# Patient Record
Sex: FEMALE | Race: WHITE | NOT HISPANIC OR LATINO | Employment: FULL TIME | URBAN - METROPOLITAN AREA
[De-identification: names, ages, dates, MRNs, and addresses within clinical notes are randomized per-mention and may not be internally consistent; named-entity substitution may affect disease eponyms.]

---

## 2024-03-27 ENCOUNTER — TRANSFERRED RECORDS (OUTPATIENT)
Dept: HEALTH INFORMATION MANAGEMENT | Facility: CLINIC | Age: 54
End: 2024-03-27

## 2024-08-15 NOTE — TELEPHONE ENCOUNTER
Action 8/15/2024   Action Taken 1) Requested records from [facility] via fax      REASON FOR VISIT: bilateral knee pain requesting for cortisone injection    DATE OF APPT: 8/17/2024   NOTES (FOR ALL VISITS) STATUS DETAILS   OFFICE NOTE from referring provider N/A    MEDICATION LIST Internal    IMAGING  (FOR ALL VISITS)     XR In process    MRI (HEAD, NECK, SPINE) N/A    CT (HEAD, NECK, SPINE) N/A

## 2024-08-16 NOTE — PROGRESS NOTES
Joyce Beck  :  1970  DOS: 2024  MRN: [id number]  PCP: No Ref-Primary, Physician    Sports Medicine Clinic Visit      HPI  Joyce Beck is a 54 year old female who is seen as a self referral presenting with bilateral knee pain, chronic.    - Mechanism of Injury:    - No inciting injury  - Pertinent history and prior evaluations:    - Previously seen with [facility] for bilateral primary knee osteoarthritis, worst in the right medial compartment.   - Has gotten bilateral Synvisc 1 injections on 2024.    - Also taking diclofenac for pain management.    - Corticosteroid injection of the right knee on 3/27/2024.    - XR R knee with weightbearing bilateral views in PA flexion shows moderate degenerative changes bilaterally, worst in the medial compartments, right greater than left.  No acute fractures or dislocations.  No significant knee joint effusion.  Prominent osteophytes in the superior patellofemoral compartment on the right.  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    - Pain Character:    -Pain in the anteromedial knees bilaterally that is worse with direct pressure, prolonged walking, squatting, getting in and out of chairs, and other activities.  Mild swelling occasionally.  Mild grinding, no major instability episodes, mechanical locking symptoms.  Denies neurogenic components, numbness, tingling, radicular shooting pain.  Antalgic weakness present.  Treatments tried are listed above.      Review of Systems  Musculoskeletal: as above  Remainder of review of systems is negative including constitutional, CV, pulmonary, GI, Skin and Neurologic except as noted in HPI or medical history.    Past Medical History:   Diagnosis Date    Unspecified hypothyroidism      Past Surgical History:   Procedure Laterality Date    C LIGATE FALLOPIAN TUBE       Family History   Problem Relation Age of Onset    Cancer No family hx of     Diabetes Paternal Grandfather     Diabetes Brother     Diabetes Sister      Heart Disease No family hx of     Heart Disease Maternal Grandfather         MI     Hypertension Paternal Grandfather     Cerebrovascular Disease No family hx of          Objective  There were no vitals taken for this visit.    General: healthy, alert and in no acute distress.    HEENT: no scleral icterus or conjunctival erythema.   Skin: no suspicious lesions or rash. No jaundice.   CV: regular rhythm by palpation, 2+ distal pulses.  Resp: normal respiratory effort without conversational dyspnea.   Psych: normal mood and affect.    Gait: nonantalgic, appropriate coordination and balance.     Neuro:        - Sensation to light touch:    - Intact throughout the BLE including all peripheral nerve distributions.        - MSR:      RLE  LLE  - Patella 2+ 2+  - Achilles 2+ 2+       - Special tests:   - Slump/SLR:  Neg    MSK - Knee:       - Inspection:    - No significant effusion, erythema, warmth, ecchymosis, lesion.        - ROM:    - Full AROM/PROM with pain during knee flexion/extension.        - Palpation:    - TTP at the medial joint line.  - NTTP elsewhere.        - Strength:  (*antalgic)  - Hip Flexion  5     - Hip Abduction 5    - Hip Adduction 5   - Knee Flexion  5   - Knee Extension 5   - Dorsiflexion  5   - Plantarflexion 5   - Ext. Jovi. Longus 5   - Inversion  5   - Eversion  5        - Special tests:        - Lachman:  Neg         - A/P drawer:  Neg        - Pivot shift:  Neg    - rCissy:  Neg      - Varus stress:  Neg for laxity or pain     - Valgus stress:  Neg for laxity or pain    - Patellar grind: Positive   - Thessaly:  Neg         Radiology  I independently reviewed the available relevant imaging in the chart with my interpretations as above in HPI.       Procedure  Large Joint Injection/Arthocentesis: bilateral knee    Date/Time: 2024 10:10 AM    Performed by: Neeraj Can DO  Authorized by: Neeraj Can DO    Indications:  Pain  Needle Size:  22 G  Guidance: ultrasound     Approach:  Lateral  Location:  Knee  Laterality:  Bilateral      Medications (Right):  40 mg triamcinolone 40 MG/ML  Medications (Left):  40 mg triamcinolone 40 MG/ML  Outcome:  Tolerated well, no immediate complications  Procedure discussed: discussed risks, benefits, and alternatives    Consent Given by:  Patient  Timeout: timeout called immediately prior to procedure    Prep: patient was prepped and draped in usual sterile fashion      Ultrasound Guided Intra-articular Knee Injection  The knee was prepped and draped in a sterile manner.  Ultrasound identification of the patella, suprapatellar pouch, quadriceps tendon and femur in both long and short axis was obtained. The probe was placed in short axis to the femur. A 1.5 inch 22 gauge needle was placed under ultrasound guidance into the superior knee joint using a lateral approach.  A mixture of 2 mL of 1% lidocaine, 2 mL of 0.5% bupivacaine and 1 ml kenalog (40mg/ml) was injected without difficulty. The needle was removed and there was good hemostasis without complications.  Patient tolerated procedure well.  There was ultrasound documentation of needle placement and injection. The procedure was duplicated on the contralateral side using an identical protocol.         Assessment  1. Primary osteoarthritis of both knees        Plan  Joyce Beck is a pleasant 54 year old female that presents with chronic bilateral knee pain secondary to primary osteoarthritis.  Previously managed with [facility] orth[facility] with corticosteroid injections and Synvisc injections.  Last corticosteroid injection was in March 2024.  Presents today to discuss additional injections and other treatment options.    We discussed the nature of the condition and available treatment options, and mutually agreed upon the following plan:    - Imaging:          - Reviewed and independently interpreted the relevant imaging in the chart, including any imaging ordered for today's  clinic.  - Reviewed results and images with patient.   - Medications:          - Discussed pharmacologic options for pain relief.   - May use NSAIDs (Ibuprofen, Naproxen) or Acetaminophen (Tylenol) as needed for pain control.   - Do not take these if previously advised to avoid them for other medical conditions.  - May also use topical medications such as lidocaine, IcyHot, BioFreeze, or Voltaren gel as needed for pain control.    - Voltaren gel is an anti-inflammatory cream that may be used up to 4 times per day over the painful area.   - Injections:          - Discussed possible injection options and alternatives.   - Performed a corticosteroid injection of the bilateral intra-articular knee joints under ultrasound guidance today in clinic. Patient tolerated the procedure well without complications.     - Post-procedure instructions:    - Keep the injection site clean and dry.   - Do not submerge the injection site for 24 hours (no baths, pools). Showers are ok.   - Rest the area for 24-48 hours before resuming normal activities. Avoid overexerting the area for the first few weeks.   - It may take 2-3 days to start noticing the effects of the injection and up to 3-4 weeks to feel significant benefits.   - Therapy:          - Discussed the benefits of therapy vs home exercise program for optimization of range of motion, flexibility, strength, stability and function.   - Preference is for a home exercise program.   - Home Exercise Program given today in clinic and recommendation given to perform HEP daily and after exacerbations.  - Modalities:          - May use ice, heat, massage or other modalities as needed.   - Bracing:          - Discussed bracing options and may consider an  brace,  bracing trial referral has been placed today and instructions given for scheduling.  - Surgery:          - Discussed non-operative and operative treatment options for the patient's condition.   - Goal is to  continue conservative care for as long as possible before surgical intervention would need to be considered.  - Activity:          - Encouraged to remain active and participate in regular activities as symptoms allow.   Avoid or modify exacerbating activities as needed.  - Follow up:          - As needed for re-evaluation and update to treatment plan.  - May follow up sooner for new/worsening symptoms.  - May contact clinic by phone or MyChart for questions or concerns.       Neeraj Can DO, CAQSM  [facility] Medicine  [facility] - Department of Orthopedic Surgery       Disclaimer:  This note was prepared and written using Dragon Medical dictation software. As a result, there may be errors in the script that have gone undetected. Please consider this when interpreting the information in this note.

## 2024-08-17 ENCOUNTER — OFFICE VISIT (OUTPATIENT)
Dept: ORTHOPEDICS | Facility: CLINIC | Age: 54
End: 2024-08-17
Payer: COMMERCIAL

## 2024-08-17 ENCOUNTER — PRE VISIT (OUTPATIENT)
Dept: ORTHOPEDICS | Facility: CLINIC | Age: 54
End: 2024-08-17

## 2024-08-17 DIAGNOSIS — M17.0 PRIMARY OSTEOARTHRITIS OF BOTH KNEES: Primary | ICD-10-CM

## 2024-08-17 PROCEDURE — 99204 OFFICE O/P NEW MOD 45 MIN: CPT | Mod: 25 | Performed by: STUDENT IN AN ORGANIZED HEALTH CARE EDUCATION/TRAINING PROGRAM

## 2024-08-17 PROCEDURE — 20611 DRAIN/INJ JOINT/BURSA W/US: CPT | Mod: 50 | Performed by: STUDENT IN AN ORGANIZED HEALTH CARE EDUCATION/TRAINING PROGRAM

## 2024-08-17 RX ORDER — DICLOFENAC SODIUM 75 MG/1
75 TABLET, DELAYED RELEASE ORAL 2 TIMES DAILY
COMMUNITY
Start: 2023-12-21 | End: 2024-09-23

## 2024-08-17 RX ORDER — LEVOTHYROXINE SODIUM 88 UG/1
88 TABLET ORAL DAILY
COMMUNITY
End: 2024-09-23

## 2024-08-17 RX ADMIN — TRIAMCINOLONE ACETONIDE 40 MG: 40 INJECTION, SUSPENSION INTRA-ARTICULAR; INTRAMUSCULAR at 10:10

## 2024-08-17 ASSESSMENT — PAIN SCALES - GENERAL: PAINLEVEL: EXTREME PAIN (8)

## 2024-08-17 NOTE — PATIENT INSTRUCTIONS
For questions about the cost of your visit, or the cost of any procedure, lab or imaging study, please contact our CONSUMER PRICE LINE at [phone] for an estimate.  You may also contact them at [email or web address]    You will be asked to provide your name, birthdate, address, phone number, and insurance information.  You will also need to know the name of any specific test or procedure which is planned.  This often requires the CPT (common procedural terminology) code for the test or procedure.  Our clinic staff can help you get that information, if needed.      ------------------------------------------------  What is PRP?   Platelet-rich plasm  (PRP) is a procedure in which the platelets/healing factors are concentrated using the patient's own blood.  The procedure is used to stimulate the body's own natural healing response.    What MSK Conditions Can be PRP be Used for?  Tendinosis/tendonitis  Acute and Chronic muscle strains/tears  Osteoarthritis   Ligament Sprains    How does it Work?  The day of the procedure a small amount of blood will be drawn from the patient's arm.  The blood is then placed into a centrifuge to separate the red blood cells and other blood components to produce PRP.  Using ultrasound guidance to ensure accuracy, the concentrated PRP is then injected into the injured area/tissue.  Following the injection, the PRP releases cell-growth factors that trigger the body's natural healing response.  You will experience increased inflammation and soreness for several days following the procedure.  You should treat area as a new injured and may be placed on crutches or a sling for a short-period.  PRP can usually expect relief 6 - 8 weeks following the procedure.  For chronic issues a repeat injection is sometimes needed.  The decision for repeat injections will be made between you and your doctor.    Is PRP Covered by Health Insurance?  PRP is considered an experimental procedure that  is cash based at this time.  [facility] currently charges $800/body part or $1200 for multiple sites in a single day.  Payment for the procedure is due the day of the injection.    It is possible that your HSA dollars may be used for the procedure, however patients are encouraged to check with their insurance first.      What to do Prior to your PRP Procedure?  Patients should not use any anti-inflammatory medications/NSAIDs (like aspirin, naproxen, ibuprofen, diclofenac,  ) for at least 3 weeks prior to the injection.  You may use Tylenol/acetaminophen for pain control as needed.  Patients on long-term anticoagulation medications like Warfarin/Coumadin, Eliquis, Lovenox, or Plavix should discuss holding the medication for 1 - 2 days with anticoagulation clinic.  Please hydrate by drinking 8 glasses (64 oz) of water within 24 hours of the procedure.  Arrange for someone to drive you home after the procedure    What to Expect Day of Procedure?  Forms will be completed and payment of ($800/1200) is due when checking in for the procedure.  Your arm will be cleaned, and a small amount of blood will be drawn.  The blood will then be taken to a centrifuge to spun down to separate the PRP from the whole blood.    After the PRP is  you will be injected with the concentrated PRP under ultrasound guidance.    Please plan for this appointment to take ~ 60 minutes to complete.    After the PRP Procedure?  You may experience increased achiness and soreness for 3 - 5 days following the procedure.  Your provider may discuss providing a small amount of narcotic pain medication to help with pain.  Please schedule to have another person drive you home following your procedure.  Patients should not use any anti-inflammatory medications/NSAIDs (like aspirin, naproxen, ibuprofen, diclofenac,  ) for at least 3 weeks following the procedure.  Avoid using ice on the area for ~ 2 weeks.  You may use Tylenol/acetaminophen for  pain control as needed.    Plan to rest the remainder of the day and for approximately 3 days following the procedure.    You may be in a sling or on crutches for ~ 3 - 5 days.  Plan to provide the clinic with an update either via phone or [employer] approximately 2 weeks after your procedure.  We will schedule a follow up appointment for approximately 6 weeks after the procedure to formally reassess your pain levels.    Specific physical therapy instructions will be provided if needed.  Most patients require only a single treatment, depending on the degree of injury.  However each individual case is different and repeat treatments may be indicated. The decision for repeat injections will be made between you and your doctor.      Please contact the clinic or reach out via [employer] to schedule your PRP procedure or with further questions for your clinical teams.      For information about how your insurance will cover your clinic visit, please call the customer service number on your insurance card.

## 2024-08-17 NOTE — NURSING NOTE
Chief Complaint   Patient presents with    Right Knee - Pain, New Patient    Left Knee - Pain, New Patient       There were no vitals filed for this visit.    There is no height or weight on file to calculate BMI.      SARINA Hadley NREMT

## 2024-08-17 NOTE — LETTER
2024      Joyce Beck  [address] [address] [facility] [address]      Dear Colleague,    Thank you for referring your patient, Joyce Beck, to the [facility] [place]. Please see a copy of my visit note below.    Joyce Beck  :  1970  DOS: 2024  MRN: [id number]  PCP: No Ref-Primary, Physician    Sports Medicine Clinic Visit      HPI  Joyce Beck is a 54 year old female who is seen as a self referral presenting with bilateral knee pain, chronic.    - Mechanism of Injury:    - No inciting injury  - Pertinent history and prior evaluations:    - Previously seen with [facility] for bilateral primary knee osteoarthritis, worst in the right medial compartment.   - Has gotten bilateral Synvisc 1 injections on 2024.    - Also taking diclofenac for pain management.    - Corticosteroid injection of the right knee on 3/27/2024.    - XR R knee with weightbearing bilateral views in PA flexion shows moderate degenerative changes bilaterally, worst in the medial compartments, right greater than left.  No acute fractures or dislocations.  No significant knee joint effusion.  Prominent osteophytes in the superior patellofemoral compartment on the right.  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    - Pain Character:    -Pain in the anteromedial knees bilaterally that is worse with direct pressure, prolonged walking, squatting, getting in and out of chairs, and other activities.  Mild swelling occasionally.  Mild grinding, no major instability episodes, mechanical locking symptoms.  Denies neurogenic components, numbness, tingling, radicular shooting pain.  Antalgic weakness present.  Treatments tried are listed above.      Review of Systems  Musculoskeletal: as above  Remainder of review of systems is negative including constitutional, CV, pulmonary, GI, Skin and Neurologic except as noted in HPI or medical history.    Past Medical History:   Diagnosis Date     Unspecified  hypothyroidism      Past Surgical History:   Procedure Laterality Date     C LIGATE FALLOPIAN TUBE       Family History   Problem Relation Age of Onset     Cancer No family hx of      Diabetes Paternal Grandfather      Diabetes Brother      Diabetes Sister      Heart Disease No family hx of      Heart Disease Maternal Grandfather         MI      Hypertension Paternal Grandfather      Cerebrovascular Disease No family hx of          Objective  There were no vitals taken for this visit.    General: healthy, alert and in no acute distress.    HEENT: no scleral icterus or conjunctival erythema.   Skin: no suspicious lesions or rash. No jaundice.   CV: regular rhythm by palpation, 2+ distal pulses.  Resp: normal respiratory effort without conversational dyspnea.   Psych: normal mood and affect.    Gait: nonantalgic, appropriate coordination and balance.     Neuro:        - Sensation to light touch:    - Intact throughout the BLE including all peripheral nerve distributions.        - MSR:      RLE  LLE  - Patella 2+ 2+  - Achilles 2+ 2+       - Special tests:   - Slump/SLR:  Neg    MSK - Knee:       - Inspection:    - No significant effusion, erythema, warmth, ecchymosis, lesion.        - ROM:    - Full AROM/PROM with pain during knee flexion/extension.        - Palpation:    - TTP at the medial joint line.  - NTTP elsewhere.        - Strength:  (*antalgic)  - Hip Flexion  5     - Hip Abduction 5    - Hip Adduction 5   - Knee Flexion  5   - Knee Extension 5   - Dorsiflexion  5   - Plantarflexion 5   - Ext. Jovi. Longus 5   - Inversion  5   - Eversion  5        - Special tests:        - Lachman:  Neg         - A/P drawer:  Neg        - Pivot shift:  Neg    - Crissy:  Neg      - Varus stress:  Neg for laxity or pain     - Valgus stress:  Neg for laxity or pain    - Patellar grind: Positive   - Thessaly:  Neg         Radiology  I independently reviewed the available relevant imaging in the chart with my  interpretations as above in HPI.       Procedure  Large Joint Injection/Arthocentesis: bilateral knee    Date/Time: 8/17/2024 10:10 AM    Performed by: Neeraj Can DO  Authorized by: Neeraj Can DO    Indications:  Pain  Needle Size:  22 G  Guidance: ultrasound    Approach:  Lateral  Location:  Knee  Laterality:  Bilateral      Medications (Right):  40 mg triamcinolone 40 MG/ML  Medications (Left):  40 mg triamcinolone 40 MG/ML  Outcome:  Tolerated well, no immediate complications  Procedure discussed: discussed risks, benefits, and alternatives    Consent Given by:  Patient  Timeout: timeout called immediately prior to procedure    Prep: patient was prepped and draped in usual sterile fashion      Ultrasound Guided Intra-articular Knee Injection  The knee was prepped and draped in a sterile manner.  Ultrasound identification of the patella, suprapatellar pouch, quadriceps tendon and femur in both long and short axis was obtained. The probe was placed in short axis to the femur. A 1.5 inch 22 gauge needle was placed under ultrasound guidance into the superior knee joint using a lateral approach.  A mixture of 2 mL of 1% lidocaine, 2 mL of 0.5% bupivacaine and 1 ml kenalog (40mg/ml) was injected without difficulty. The needle was removed and there was good hemostasis without complications.  Patient tolerated procedure well.  There was ultrasound documentation of needle placement and injection. The procedure was duplicated on the contralateral side using an identical protocol.         Assessment  1. Primary osteoarthritis of both knees        Sabino Beck is a pleasant 54 year old female that presents with chronic bilateral knee pain secondary to primary osteoarthritis.  Previously managed with [facility] with corticosteroid injections and Synvisc injections.  Last corticosteroid injection was in March 2024.  Presents today to discuss additional injections and other treatment  options.    We discussed the nature of the condition and available treatment options, and mutually agreed upon the following plan:    - Imaging:          - Reviewed and independently interpreted the relevant imaging in the chart, including any imaging ordered for today's clinic.  - Reviewed results and images with patient.   - Medications:          - Discussed pharmacologic options for pain relief.   - May use NSAIDs (Ibuprofen, Naproxen) or Acetaminophen (Tylenol) as needed for pain control.   - Do not take these if previously advised to avoid them for other medical conditions.  - May also use topical medications such as lidocaine, IcyHot, BioFreeze, or Voltaren gel as needed for pain control.    - Voltaren gel is an anti-inflammatory cream that may be used up to 4 times per day over the painful area.   - Injections:          - Discussed possible injection options and alternatives.   - Performed a corticosteroid injection of the bilateral intra-articular knee joints under ultrasound guidance today in clinic. Patient tolerated the procedure well without complications.     - Post-procedure instructions:    - Keep the injection site clean and dry.   - Do not submerge the injection site for 24 hours (no baths, pools). Showers are ok.   - Rest the area for 24-48 hours before resuming normal activities. Avoid overexerting the area for the first few weeks.   - It may take 2-3 days to start noticing the effects of the injection and up to 3-4 weeks to feel significant benefits.   - Therapy:          - Discussed the benefits of therapy vs home exercise program for optimization of range of motion, flexibility, strength, stability and function.   - Preference is for a home exercise program.   - Home Exercise Program given today in clinic and recommendation given to perform HEP daily and after exacerbations.  - Modalities:          - May use ice, heat, massage or other modalities as needed.   - Bracing:          - Discussed  bracing options and may consider an  brace,  bracing trial referral has been placed today and instructions given for scheduling.  - Surgery:          - Discussed non-operative and operative treatment options for the patient's condition.   - Goal is to continue conservative care for as long as possible before surgical intervention would need to be considered.  - Activity:          - Encouraged to remain active and participate in regular activities as symptoms allow.   Avoid or modify exacerbating activities as needed.  - Follow up:          - As needed for re-evaluation and update to treatment plan.  - May follow up sooner for new/worsening symptoms.  - May contact clinic by phone or MyChart for questions or concerns.       Neeraj Can DO, CAQSM  [facility] Sports Medicine  [facility] - Department of Orthopedic Surgery       Disclaimer:  This note was prepared and written using Dragon Medical dictation software. As a result, there may be errors in the script that have gone undetected. Please consider this when interpreting the information in this note.       Again, thank you for allowing me to participate in the care of your patient.        Sincerely,        Neeraj Can DO

## 2024-08-17 NOTE — NURSING NOTE
[facility]   ORTHOPEDICS & SPORTS MEDICINE  [address] [place] [address]  Dept: [phone]  ______________________________________________________________________________    Patient: Joyce Beck   : 1970   MRN: [id number]   2024    INVASIVE PROCEDURE SAFETY CHECKLIST    Date: 2024   Procedure: Bilateral knee injection  Patient Name: Joyce Beck  MRN: [id number]  YOB: 1970    Action: Complete sections as appropriate. Any discrepancy results in a HARD COPY until resolved.     PRE PROCEDURE:  Patient ID verified with 2 identifiers (name and  or MRN): Yes  Procedure and site verified with patient/designee (when able): Yes  Accurate consent documentation in medical record: Yes  H&P (or appropriate assessment) documented in medical record: Yes  H&P must be up to 20 days prior to procedure and updates within 24 hours of procedure as applicable: NA  Relevant diagnostic and radiology test results appropriately labeled and displayed as applicable: Yes  Procedure site(s) marked with provider initials: NA    TIMEOUT:  Time-Out performed immediately prior to starting procedure, including verbal and active participation of all team members addressing the following:Yes  * Correct patient identify  * Confirmed that the correct side and site are marked  * An accurate procedure consent form  * Agreement on the procedure to be done  * Correct patient position  * Relevant images and results are properly labeled and appropriately displayed  * The need to administer antibiotics or fluids for irrigation purposes during the procedure as applicable   * Safety precautions based on patient history or medication use    DURING PROCEDURE: Verification of correct person, site, and procedures any time the responsibility for care of the patient is transferred to another member of the care team.       Prior to injection, verified patient identity using patient's name and  date of birth.  Due to injection administration, patient instructed to remain in clinic for 15 minutes  afterwards, and to report any adverse reaction to me immediately.    Joint injection was performed.      Drug Amount Wasted:  None.  Vial/Syringe: Single dose vial  Expiration Date:  4/17/2024      Jomar Laird, EMT  August 17, 2024

## 2024-08-23 RX ORDER — TRIAMCINOLONE ACETONIDE 40 MG/ML
40 INJECTION, SUSPENSION INTRA-ARTICULAR; INTRAMUSCULAR
Status: SHIPPED | OUTPATIENT
Start: 2024-08-17

## 2024-09-23 ENCOUNTER — OFFICE VISIT (OUTPATIENT)
Dept: FAMILY MEDICINE | Facility: OTHER | Age: 54
End: 2024-09-23
Payer: COMMERCIAL

## 2024-09-23 VITALS
DIASTOLIC BLOOD PRESSURE: 90 MMHG | OXYGEN SATURATION: 97 % | WEIGHT: 237 LBS | BODY MASS INDEX: 41.99 KG/M2 | SYSTOLIC BLOOD PRESSURE: 130 MMHG | HEART RATE: 81 BPM | HEIGHT: 63 IN | TEMPERATURE: 97.7 F | RESPIRATION RATE: 14 BRPM

## 2024-09-23 DIAGNOSIS — R03.0 ELEVATED BP WITHOUT DIAGNOSIS OF HYPERTENSION: ICD-10-CM

## 2024-09-23 DIAGNOSIS — E03.8 OTHER SPECIFIED HYPOTHYROIDISM: Primary | ICD-10-CM

## 2024-09-23 DIAGNOSIS — M17.0 PRIMARY OSTEOARTHRITIS OF BOTH KNEES: ICD-10-CM

## 2024-09-23 DIAGNOSIS — Z13.1 SCREENING FOR DIABETES MELLITUS: ICD-10-CM

## 2024-09-23 DIAGNOSIS — Z13.220 SCREENING FOR LIPID DISORDERS: ICD-10-CM

## 2024-09-23 PROCEDURE — 99214 OFFICE O/P EST MOD 30 MIN: CPT | Performed by: PHYSICIAN ASSISTANT

## 2024-09-23 RX ORDER — DICLOFENAC SODIUM 75 MG/1
75 TABLET, DELAYED RELEASE ORAL 2 TIMES DAILY PRN
Qty: 180 TABLET | Refills: 3 | Status: SHIPPED | OUTPATIENT
Start: 2024-09-23

## 2024-09-23 RX ORDER — ACETAMINOPHEN 325 MG/1
325-650 TABLET ORAL EVERY 6 HOURS PRN
COMMUNITY
Start: 2023-11-20 | End: 2024-09-23

## 2024-09-23 RX ORDER — ALBUTEROL SULFATE 90 UG/1
2 AEROSOL, METERED RESPIRATORY (INHALATION) EVERY 6 HOURS PRN
COMMUNITY
Start: 2023-09-06

## 2024-09-23 RX ORDER — DICLOFENAC SODIUM 75 MG/1
75 TABLET, DELAYED RELEASE ORAL 2 TIMES DAILY PRN
Qty: 180 TABLET | Refills: 3 | Status: SHIPPED | OUTPATIENT
Start: 2024-09-23 | End: 2024-09-23

## 2024-09-23 RX ORDER — LEVOTHYROXINE SODIUM 88 UG/1
88 TABLET ORAL DAILY
Qty: 90 TABLET | Refills: 3 | Status: SHIPPED | OUTPATIENT
Start: 2024-09-23

## 2024-09-23 RX ORDER — LEVOTHYROXINE SODIUM 88 UG/1
88 TABLET ORAL DAILY
Qty: 90 TABLET | Refills: 3 | Status: SHIPPED | OUTPATIENT
Start: 2024-09-23 | End: 2024-09-23

## 2024-09-23 ASSESSMENT — PAIN SCALES - GENERAL: PAINLEVEL: MILD PAIN (2)

## 2024-09-23 NOTE — PROGRESS NOTES
Assessment & Plan     ICD-10-CM    1. Other specified hypothyroidism  E03.8 TSH with free T4 reflex     levothyroxine (SYNTHROID/LEVOTHROID) 88 MCG tablet     DISCONTINUED: levothyroxine (SYNTHROID/LEVOTHROID) 88 MCG tablet      2. Primary osteoarthritis of both knees  M17.0 Basic metabolic panel  (Ca, Cl, CO2, Creat, Gluc, K, Na, BUN)     diclofenac (VOLTAREN) 75 MG EC tablet     DISCONTINUED: diclofenac (VOLTAREN) 75 MG EC tablet      3. Screening for lipid disorders  Z13.220 Lipid panel reflex to direct LDL Fasting      4. Screening for diabetes mellitus  Z13.1 Basic metabolic panel  (Ca, Cl, CO2, Creat, Gluc, K, Na, BUN)     CANCELED: Glucose        - Patient here to establish care     Thyroid   - Ran out of medication awhile ago, not feeling well   - Recommend restart Levothyroxine at previous dose of 88 mcg   - Will plan to recheck labs in about 6 weeks to assure back in range   - Did answer patient's questions about armor thyroid, will save this for if doesn't continue to do well on Levothyroxine as patient has done in the past     2. OA   - Diclofenac very helpful, ran out   - Reviewed medication use and side effects, refilled     3 & 4.   - Will plan to come fasting to recheck of thyroid labs to get these screening labs     5. Elevated BP   - Not a normal thing for patient, elevated x2         Did have dizziness and some nausea, BP was elevated at home but this is also the first time this has happened to her      Denies shortness of breath, cp, patel   - Recommend monitoring at home, keep a log  - Instructed to let us know if continues to be high at home       PLAN   Restart Levothyroxine 88 mcg   Schedule fasting labs (>10 hours nothing to eat or drink, water is ok)   Recheck ~6 weeks   Monitor BP periodically, keep a log, bring to next appointment   Call if BP consistently >140/90   Do BP when most relaxed       Review of the result(s) of each unique test - See list          Care Everywhere - 8/17/23 - TSH   "               11/20/23 - hgb   Diagnosis or treatment significantly limited by social determinants of health - None     25 minutes spent on the date of the encounter doing chart review, history and exam, documentation and further activities as noted above    The patient indicates understanding of these issues and agrees with the plan.    Follow up: 6 weeks with fasting labs     Isai Olson PA-C  [facility] - Lazaro Cook is a 54 year old, presenting for the following health issues:  Establish Care and Recheck Medication        9/23/2024     4:17 PM   Additional Questions   Roomed by Landy SAVAGE     History of Present Illness       Reason for visit:  Thyroid   She is taking medications regularly.     Hypothyroidism Follow-up    Since last visit, patient describes the following symptoms: Weight stable, no hair loss, no skin changes, no constipation, no loose stools    - Thinks labs were last checked and medication changed last summer     Ran out of medications in July      Taking old dose of 77 mcg here and there      Was increased to 88 mcg     - Elevated BP - new      Checks occasionally   - Nausea and dizziness the last 2 mornings      Checked /90, recheck 120/80 around 30 min later      No chest pain or shortness of breath   - Diclofenac - takes for her knees   - Hasn't had in a few days         Review of Systems  Constitutional, HEENT, cardiovascular, pulmonary, gi and gu systems are negative, except as otherwise noted.      Objective    BP (!) 130/90   Pulse 81   Temp 97.7  F (36.5  C)   Resp 14   Ht 1.604 m (5' 3.15\")   Wt 107.5 kg (237 lb)   SpO2 97%   BMI 41.78 kg/m    Body mass index is 41.78 kg/m .  Physical Exam   GENERAL APPEARANCE: healthy, alert and no distress  EYES: Eyes grossly normal to inspection, PERRLA, conjunctivae and sclerae without injection or discharge, EOM intact   RESP: Lungs clear to auscultation - no rales, rhonchi or wheezes  "   CV: Regular rates and rhythm, normal S1 S2, no S3 or S4, no murmur, click or rub, no peripheral edema and peripheral pulses strong and symmetric bilaterally   MS: No musculoskeletal defects are noted and gait is age appropriate without ataxia   SKIN: No suspicious lesions or rashes, hydration status appears adeuqate with normal skin turgor   PSYCH: Alert and oriented x3; speech- coherent , normal rate and volume; able to articulate logical thoughts, able to abstract reason, no tangential thoughts, no hallucinations or delusions, mentation appears normal, Mood is euthymic. Affect is appropriate for this mood state and bright. Thought content is free of suicidal ideation, hallucinations, and delusions. Dress is adequate and upkept. Eye contact is good during conversation.       Diagnostics: reviewed in Epic, see orders pending in Epic         Signed Electronically by: Veronique Olson PA-C

## 2024-09-23 NOTE — PATIENT INSTRUCTIONS
Restart Levothyroxine 88 mcg   Schedule fasting labs (>10 hours nothing to eat or drink, water is ok)   Recheck ~6 weeks   Monitor BP periodically, keep a log, bring to next appointment   Call if BP consistently >140/90   Do BP when most relaxed

## 2024-11-04 ENCOUNTER — OFFICE VISIT (OUTPATIENT)
Dept: FAMILY MEDICINE | Facility: OTHER | Age: 54
End: 2024-11-04
Payer: COMMERCIAL

## 2024-11-04 VITALS
TEMPERATURE: 97.8 F | OXYGEN SATURATION: 97 % | HEIGHT: 63 IN | HEART RATE: 88 BPM | WEIGHT: 235 LBS | RESPIRATION RATE: 20 BRPM | BODY MASS INDEX: 41.64 KG/M2 | SYSTOLIC BLOOD PRESSURE: 124 MMHG | DIASTOLIC BLOOD PRESSURE: 84 MMHG

## 2024-11-04 DIAGNOSIS — Z12.31 VISIT FOR SCREENING MAMMOGRAM: ICD-10-CM

## 2024-11-04 DIAGNOSIS — Z12.11 SCREEN FOR COLON CANCER: ICD-10-CM

## 2024-11-04 DIAGNOSIS — Z13.220 SCREENING FOR LIPID DISORDERS: ICD-10-CM

## 2024-11-04 DIAGNOSIS — M17.0 PRIMARY OSTEOARTHRITIS OF BOTH KNEES: ICD-10-CM

## 2024-11-04 DIAGNOSIS — E03.9 HYPOTHYROIDISM, UNSPECIFIED TYPE: Primary | ICD-10-CM

## 2024-11-04 DIAGNOSIS — Z13.1 SCREENING FOR DIABETES MELLITUS: ICD-10-CM

## 2024-11-04 LAB
ANION GAP SERPL CALCULATED.3IONS-SCNC: 12 MMOL/L (ref 7–15)
BUN SERPL-MCNC: 16.8 MG/DL (ref 6–20)
CALCIUM SERPL-MCNC: 10.2 MG/DL (ref 8.8–10.4)
CHLORIDE SERPL-SCNC: 100 MMOL/L (ref 98–107)
CHOLEST SERPL-MCNC: 215 MG/DL
CREAT SERPL-MCNC: 0.85 MG/DL (ref 0.51–0.95)
EGFRCR SERPLBLD CKD-EPI 2021: 81 ML/MIN/1.73M2
FASTING STATUS PATIENT QL REPORTED: YES
FASTING STATUS PATIENT QL REPORTED: YES
GLUCOSE SERPL-MCNC: 83 MG/DL (ref 70–99)
HCO3 SERPL-SCNC: 25 MMOL/L (ref 22–29)
HDLC SERPL-MCNC: 50 MG/DL
LDLC SERPL CALC-MCNC: 141 MG/DL
NONHDLC SERPL-MCNC: 165 MG/DL
POTASSIUM SERPL-SCNC: 3.8 MMOL/L (ref 3.4–5.3)
SODIUM SERPL-SCNC: 137 MMOL/L (ref 135–145)
TRIGL SERPL-MCNC: 120 MG/DL
TSH SERPL DL<=0.005 MIU/L-ACNC: 3.4 UIU/ML (ref 0.3–4.2)

## 2024-11-04 PROCEDURE — 80061 LIPID PANEL: CPT | Performed by: PHYSICIAN ASSISTANT

## 2024-11-04 PROCEDURE — 99214 OFFICE O/P EST MOD 30 MIN: CPT | Performed by: PHYSICIAN ASSISTANT

## 2024-11-04 PROCEDURE — 84443 ASSAY THYROID STIM HORMONE: CPT | Performed by: PHYSICIAN ASSISTANT

## 2024-11-04 PROCEDURE — 80048 BASIC METABOLIC PNL TOTAL CA: CPT | Performed by: PHYSICIAN ASSISTANT

## 2024-11-04 PROCEDURE — 36415 COLL VENOUS BLD VENIPUNCTURE: CPT | Performed by: PHYSICIAN ASSISTANT

## 2024-11-04 ASSESSMENT — PAIN SCALES - GENERAL: PAINLEVEL_OUTOF10: MILD PAIN (2)

## 2024-11-04 NOTE — PROGRESS NOTES
Assessment & Plan     ICD-10-CM    1. Hypothyroidism, unspecified type  E03.9 TSH with free T4 reflex     TSH with free T4 reflex      2. Visit for screening mammogram  Z12.31 MA Screen Bilateral w/Elvin      3. Screen for colon cancer  Z12.11 Colonoscopy Screening  Referral      4. Screening for lipid disorders  Z13.220 Lipid panel reflex to direct LDL Fasting      5. Screening for diabetes mellitus  Z13.1 Basic metabolic panel  (Ca, Cl, CO2, Creat, Gluc, K, Na, BUN)      6. Primary osteoarthritis of both knees  M17.0 Basic metabolic panel  (Ca, Cl, CO2, Creat, Gluc, K, Na, BUN)        Thyroid   - Had been off medication for awhile, restarted and is feeling better   - Will update labs today   - Results will be communicated to patient when available and appropriate medication changes made if necessary     2-5.   - Advised on health maintenance items that she is due for   - Patient to schedule mammogram, colonoscopy, and fasting labs  - Await results     6. Bilateral knee pain   - Impacting her ability to exercise   - Told in the past that has OA   - Recommend further evaluation with ortho       Review of the result(s) of each unique test - See list        Care Everywhere - 10/13/22 - mammogram                                      8/17/23 - TSH                           Diagnosis or treatment significantly limited by social determinants of health - None     15 minutes spent on the date of the encounter doing chart review, history and exam, documentation and further activities as noted above    The patient indicates understanding of these issues and agrees with the plan.    Follow up: pending labs and     Isai Loya-HOMERO Lewis  [facility] - Lazaro Cook is a 54 year old, presenting for the following health issues:  Recheck Medication      11/4/2024     3:37 PM   Additional Questions   Roomed by Becki   Accompanied by Self         11/4/2024     3:37 PM   Patient  "Reported Additional Medications   Patient reports taking the following new medications NA     History of Present Illness       Hypothyroidism:     Since last visit, patient describes the following symptoms::  None    She eats 0-1 servings of fruits and vegetables daily.She consumes 0 sweetened beverage(s) daily.She exercises with enough effort to increase her heart rate 9 or less minutes per day.  She exercises with enough effort to increase her heart rate 3 or less days per week.   She is taking medications regularly.     - BP log       132/94, 133/89, 121/80, 120/81, 115/82, 132/97, 129/91       No more dizziness     - Still fatigue      But better      Over last year bubbly and cheerful self      Hasn't been to gym         Review of Systems  Constitutional, neuro, ENT, endocrine, pulmonary, cardiac, gastrointestinal, genitourinary, musculoskeletal, integument and psychiatric systems are negative, except as otherwise noted.      Objective    /84   Pulse 88   Temp 97.8  F (36.6  C) (Temporal)   Resp 20   Ht 1.604 m (5' 3.15\")   Wt 106.6 kg (235 lb)   SpO2 97%   BMI 41.43 kg/m    Body mass index is 41.43 kg/m .  Physical Exam   GENERAL APPEARANCE: healthy, alert and no distress  EYES: Eyes grossly normal to inspection, PERRLA, conjunctivae and sclerae without injection or discharge, EOM intact   RESP: Lungs clear to auscultation - no rales, rhonchi or wheezes    CV: Regular rates and rhythm, normal S1 S2, no S3 or S4, no murmur, click or rub, no peripheral edema and peripheral pulses strong and symmetric bilaterally   MS: No musculoskeletal defects are noted and gait is age appropriate without ataxia   SKIN: No suspicious lesions or rashes, hydration status appears adeuqate with normal skin turgor   PSYCH: Alert and oriented x3; speech- coherent , normal rate and volume; able to articulate logical thoughts, able to abstract reason, no tangential thoughts, no hallucinations or delusions, mentation " appears normal, Mood is euthymic. Affect is appropriate for this mood state and bright. Thought content is free of suicidal ideation, hallucinations, and delusions. Dress is adequate and upkept. Eye contact is good during conversation.       Diagnostics: see orders pending         Signed Electronically by: Veronique Olson PA-C

## 2024-11-05 ENCOUNTER — PATIENT OUTREACH (OUTPATIENT)
Dept: CARE COORDINATION | Facility: CLINIC | Age: 54
End: 2024-11-05
Payer: COMMERCIAL

## 2024-11-05 NOTE — RESULT ENCOUNTER NOTE
Aurelianolo Joyce    Your results show a mild elevation in cholesterol. No need for medication, but you should work on a low fat diet. We will recheck next year.     TSH is in better range, but still on the higher end. I would like to give it another 6-8 weeks at this dose and then recheck the labs. Rest of labs normal.     The results are attached for your review.       Isai Olson PA-C

## 2024-11-13 ENCOUNTER — TELEPHONE (OUTPATIENT)
Dept: GASTROENTEROLOGY | Facility: CLINIC | Age: 54
End: 2024-11-13
Payer: COMMERCIAL

## 2024-11-13 NOTE — TELEPHONE ENCOUNTER
"Endoscopy Scheduling Screen    Have you had any respiratory illness or flu-like symptoms in the last 10 days?  No    What is your communication preference for Instructions and/or Bowel Prep?   MyChart    What insurance is in the chart?  Other:  R    Ordering/Referring Provider:     CELSO SALINAS      (If ordering provider performs procedure, schedule with ordering provider unless otherwise instructed. )    BMI: Estimated body mass index is 41.43 kg/m  as calculated from the following:    Height as of 11/4/24: 1.604 m (5' 3.15\").    Weight as of 11/4/24: 106.6 kg (235 lb).     Sedation Ordered  moderate sedation.   If patient BMI > 50 do not schedule in [facility].    If patient BMI > 45 do not schedule at [facility].    Are you taking methadone or Suboxone?  NO, No RN review required.    Have you been diagnosed and are being treated for severe PTSD or severe anxiety?  NO, No RN review required.    Are you taking any prescription medications for pain 3 or more times per week?   NO, No RN review required.    Do you have a history of malignant hyperthermia?  No    (Females) Are you currently pregnant?   No     Have you been diagnosed or told you have pulmonary hypertension?   No    Do you have an LVAD?  No    Have you been told you have moderate to severe sleep apnea?  No.    Have you been told you have COPD, asthma, or any other lung disease?  No    Do you have any heart conditions?  No     Have you ever had or are you waiting for an organ transplant?  No. Continue scheduling, no site restrictions.    Have you had a stroke or transient ischemic attack (TIA aka \"mini stroke\" in the last 6 months?   No    Have you been diagnosed with or been told you have cirrhosis of the liver?   No.    Are you currently on dialysis?   No    Do you need assistance transferring?   No    BMI: Estimated body mass index is 41.43 kg/m  as calculated from the following:    Height as of 11/4/24: 1.604 m (5' 3.15\").    Weight as of " 11/4/24: 106.6 kg (235 lb).     Is patients BMI > 40 and scheduling location [facility]?  Yes (If MAC sedation is ordered, schedule PAC eval)    Do you take an injectable or oral medication for weight loss or diabetes (excluding insulin)?  No    Do you take the medication Naltrexone?  No    Do you take blood thinners?  No       Prep   Are you currently on dialysis or do you have chronic kidney disease?  No    Do you have a diagnosis of diabetes?  No    Do you have a diagnosis of cystic fibrosis (CF)?  No    On a regular basis do you go 3 -5 days between bowel movements?  No    BMI > 40?  Yes (Extended Prep)    Preferred Pharmacy:        [facility] - [place], [place] - [address], [address]  [address], [address]  [facility] [address]  Phone: [phone] Fax: [phone]      Final Scheduling Details     Procedure scheduled  Colonoscopy    Surgeon:  Kyree     Date of procedure:  1/24/25     Pre-OP / PAC:   No - Not required for this site.    Location  [facility] - Patient preference.    Sedation   MAC/Deep Sedation  site      Patient Reminders:   You will receive a call from a Nurse to review instructions and health history.  This assessment must be completed prior to your procedure.  Failure to complete the Nurse assessment may result in the procedure being cancelled.      On the day of your procedure, please designate an adult(s) who can drive you home stay with you for the next 24 hours. The medicines used in the exam will make you sleepy. You will not be able to drive.      You cannot take public transportation, ride share services, or non-medical taxi service without a responsible caregiver.  Medical transport services are allowed with the requirement that a responsible caregiver will receive you at your destination.  We require that drivers and caregivers are confirmed prior to your procedure.

## 2024-12-21 ENCOUNTER — HEALTH MAINTENANCE LETTER (OUTPATIENT)
Age: 54
End: 2024-12-21

## 2025-01-22 ENCOUNTER — TELEPHONE (OUTPATIENT)
Dept: GASTROENTEROLOGY | Facility: CLINIC | Age: 55
End: 2025-01-22
Payer: COMMERCIAL

## 2025-01-22 NOTE — TELEPHONE ENCOUNTER
Patient calls today with questions about prep, unable to contact SDS nurse in [place].  Prep reviewed with patient, questions answered.     Alexandria Odonnell RN

## 2025-01-24 ENCOUNTER — HOSPITAL ENCOUNTER (OUTPATIENT)
Facility: CLINIC | Age: 55
Discharge: HOME | End: 2025-01-24
Attending: SPECIALIST | Admitting: SPECIALIST
Payer: COMMERCIAL

## 2025-01-24 VITALS
HEART RATE: 63 BPM | DIASTOLIC BLOOD PRESSURE: 90 MMHG | OXYGEN SATURATION: 98 % | SYSTOLIC BLOOD PRESSURE: 124 MMHG | RESPIRATION RATE: 16 BRPM | TEMPERATURE: 97.6 F

## 2025-01-24 LAB — COLONOSCOPY: NORMAL

## 2025-01-24 PROCEDURE — 258N000003 HC RX IP 258 OP 636: Performed by: NURSE ANESTHETIST, CERTIFIED REGISTERED

## 2025-01-24 PROCEDURE — 45385 COLONOSCOPY W/LESION REMOVAL: CPT | Mod: PT | Performed by: SPECIALIST

## 2025-01-24 PROCEDURE — 88305 TISSUE EXAM BY PATHOLOGIST: CPT | Mod: TC | Performed by: SPECIALIST

## 2025-01-24 PROCEDURE — 45380 COLONOSCOPY AND BIOPSY: CPT | Mod: PT | Performed by: SPECIALIST

## 2025-01-24 PROCEDURE — 370N000017 HC ANESTHESIA TECHNICAL FEE, PER MIN: Performed by: SPECIALIST

## 2025-01-24 RX ORDER — NALOXONE HYDROCHLORIDE 0.4 MG/ML
0.1 INJECTION, SOLUTION INTRAMUSCULAR; INTRAVENOUS; SUBCUTANEOUS
Status: CANCELLED | OUTPATIENT
Start: 2025-01-24

## 2025-01-24 RX ORDER — ONDANSETRON 4 MG/1
4 TABLET, ORALLY DISINTEGRATING ORAL EVERY 30 MIN PRN
Status: CANCELLED | OUTPATIENT
Start: 2025-01-24

## 2025-01-24 RX ORDER — SODIUM CHLORIDE, SODIUM LACTATE, POTASSIUM CHLORIDE, CALCIUM CHLORIDE 600; 310; 30; 20 MG/100ML; MG/100ML; MG/100ML; MG/100ML
INJECTION, SOLUTION INTRAVENOUS CONTINUOUS
Status: DISCONTINUED | OUTPATIENT
Start: 2025-01-24 | End: 2025-01-24 | Stop reason: HOSPADM

## 2025-01-24 RX ORDER — DEXAMETHASONE SODIUM PHOSPHATE 10 MG/ML
4 INJECTION, SOLUTION INTRAMUSCULAR; INTRAVENOUS
Status: CANCELLED | OUTPATIENT
Start: 2025-01-24

## 2025-01-24 RX ORDER — LIDOCAINE 40 MG/G
CREAM TOPICAL
Status: DISCONTINUED | OUTPATIENT
Start: 2025-01-24 | End: 2025-01-24 | Stop reason: HOSPADM

## 2025-01-24 RX ORDER — ONDANSETRON 2 MG/ML
4 INJECTION INTRAMUSCULAR; INTRAVENOUS EVERY 30 MIN PRN
Status: CANCELLED | OUTPATIENT
Start: 2025-01-24

## 2025-01-24 RX ADMIN — SODIUM CHLORIDE, POTASSIUM CHLORIDE, SODIUM LACTATE AND CALCIUM CHLORIDE: 600; 310; 30; 20 INJECTION, SOLUTION INTRAVENOUS at 14:03

## 2025-01-24 ASSESSMENT — ACTIVITIES OF DAILY LIVING (ADL)
ADLS_ACUITY_SCORE: 41

## 2025-01-24 NOTE — DISCHARGE INSTRUCTIONS
[facility]    Home Care Following Colonoscopy          Activity:  You have just undergone an endoscopic procedure usually performed with conscious sedation.  Do not work or operate machinery (including a car) for at least 12 hours.    I encourage you to walk and attempt to pass this air as soon as possible.    Diet:  Return to the diet you were on before your procedure but eat lightly for the first 12-24 hours.  Drink plenty of water.  Resume any regular medications unless otherwise advised by your physician.    If you had any biopsy or polyp removed please refrain from aspirin or aspirin products for 2 days.    Pain:  You may take Tylenol as needed for pain.  Expected Recovery:  You can expect some mild abdominal fullness and/or discomfort due to the air used to inflate your intestinal tract. It is also normal to have a mild sore throat after upper endoscopy.    Call Your Physician if You Have:  After Upper Endoscopy:  Shoulder, back or chest pain.  Difficulty breathing or swallowing.  Vomiting blood.  After Colonoscopy:  Worsening persisting abdominal pain which is worse with activity.  Fevers (>101 degrees F), chills or shakes.  Passage of continued blood with bowel movements.     Any questions or concerns about your recovery, please call [phone] or after hours [phone] ([phone]) Nurse Advice Line.    Follow-up Care:  You did have polyps/biopsy tissue sample(s) removed.  The polyps/biopsy tissue sample(s) will be sent to pathology.    You should receive letter in your My Chart with your results within 1-2 weeks. If asked to return to clinic please make an appointment 1 week after your procedure.  Call [phone].

## 2025-01-24 NOTE — H&P
[facility] History and Physical    Joyce Beck MRN# [id number]   Age: 54 year old YOB: 1970     Date of Admission:  (Not on file)    Home clinic: [facility]  Primary care provider: Veronique Olson          Impression and Plan:   Impression:   Screen for colon cancer [Z12.11]  No prior colonoscopy in system  Last colonoscopy 15 years ago - normal      Plan:   Proceed to Colonoscopy as planned.  The procedure, risks(bleeding, perforation), benefits and alternatives were discussed and the patient agrees to proceed. Cleared for Anesthesia             Chief Complaint:   Screen for colon cancer [Z12.11]    History is obtained from the patient          History of Present Illness:   This 54 year old female is being seen at this time for evaluation for colonoscopy.  No complaints.  No family paternal grandmother with colon ca.             Past Medical History:     Past Medical History:   Diagnosis Date    Unspecified hypothyroidism             Past Surgical History:     Past Surgical History:   Procedure Laterality Date    ZZC LIGATE FALLOPIAN TUBE              Social History:     Social History     Tobacco Use    Smoking status: Never     Passive exposure: Never    Smokeless tobacco: Never   Substance Use Topics    Alcohol use: No            Family History:     Family History   Problem Relation Age of Onset    Cancer No family hx of     Diabetes Paternal Grandfather     Diabetes Brother     Diabetes Sister     Heart Disease No family hx of     Heart Disease Maternal Grandfather         MI     Hypertension Paternal Grandfather     Cerebrovascular Disease No family hx of             Immunizations:     VACCINE/DOSE   Diptheria   DPT   DTAP   HBIG   Hepatitis A   Hepatitis B   HIB   Influenza   Measles   Meningococcal   MMR   Mumps   Pneumococcal   Polio   Rubella   Small Pox   TDAP   Varicella   Zoster            Allergies:     Allergies   Allergen  Reactions    Penicillins Swelling            Medications:     Current Facility-Administered Medications   Medication Dose Route Frequency Provider Last Rate Last Admin    triamcinolone (KENALOG-40) injection 40 mg  40 mg      40 mg at 08/17/24 1010    triamcinolone (KENALOG-40) injection 40 mg  40 mg      40 mg at 08/17/24 1010     Current Outpatient Medications   Medication Sig Dispense Refill    albuterol (PROAIR HFA/PROVENTIL HFA/VENTOLIN HFA) 108 (90 Base) MCG/ACT inhaler Inhale 2 puffs into the lungs every 6 hours as needed for shortness of breath or cough. (Patient not taking: Reported on 11/4/2024)      bisacodyl (DULCOLAX) 5 MG EC tablet Two days prior to exam take two (2) tablets at 4pm. One day prior to exam take two (2) tablets at 4pm 4 tablet 0    diclofenac (VOLTAREN) 75 MG EC tablet Take 1 tablet (75 mg) by mouth 2 times daily as needed for moderate pain. 180 tablet 3    levothyroxine (SYNTHROID/LEVOTHROID) 88 MCG tablet Take 1 tablet (88 mcg) by mouth daily. 90 tablet 3    polyethylene glycol (GOLYTELY) 236 g suspension Two days before procedure at 5PM fill first container with water. Mix and drink an 8 oz glass every 15 minutes until HALF of the container is gone. Place the remainder in the refrigerator. One day before procedure at 5PM drink second half of bowel prep. Drink an 8 oz glass every 15 minutes until it is gone. Day of procedure 6 hours before arrival time fill the 2nd container with water. Mix and drink an 8 oz glass every 15 minutes until HALF of the container is gone. Discard the remaining solution. 8000 mL 0             Review of Systems:   The review of systems was positive for the following findings.  None.  The remainder of the review of systems was unremarkable.          Physical Exam:   All vitals have been reviewed  There were no vitals taken for this visit.  No intake or output data in the 24 hours ending 01/23/25 2342  SHEENT examination revealed NC/AT, EOMI.  Examination of the  chest revealed CTA.  Examination of the heart revealed RRR.  Examination of the abdomen revealed Soft, non tender.  The neuromuscular examination was NL.          Data:   All laboratory data reviewed  No results found for any visits on 01/24/25.  -     Randall Adorno MD, FACS

## 2025-01-28 ENCOUNTER — MYC MEDICAL ADVICE (OUTPATIENT)
Dept: FAMILY MEDICINE | Facility: OTHER | Age: 55
End: 2025-01-28
Payer: COMMERCIAL

## 2025-01-28 LAB
PATH REPORT.COMMENTS IMP SPEC: NORMAL
PATH REPORT.COMMENTS IMP SPEC: NORMAL
PATH REPORT.FINAL DX SPEC: NORMAL
PATH REPORT.GROSS SPEC: NORMAL
PATH REPORT.MICROSCOPIC SPEC OTHER STN: NORMAL
PATH REPORT.RELEVANT HX SPEC: NORMAL
PHOTO IMAGE: NORMAL

## 2025-01-28 PROCEDURE — 88305 TISSUE EXAM BY PATHOLOGIST: CPT | Mod: 26 | Performed by: PATHOLOGY

## 2025-02-05 ENCOUNTER — LAB (OUTPATIENT)
Dept: LAB | Facility: CLINIC | Age: 55
End: 2025-02-05
Payer: COMMERCIAL

## 2025-02-05 DIAGNOSIS — E03.9 HYPOTHYROIDISM, UNSPECIFIED TYPE: ICD-10-CM

## 2025-02-05 LAB
T4 FREE SERPL-MCNC: 1.45 NG/DL (ref 0.9–1.7)
TSH SERPL DL<=0.005 MIU/L-ACNC: 7.66 UIU/ML (ref 0.3–4.2)

## 2025-02-05 PROCEDURE — 84439 ASSAY OF FREE THYROXINE: CPT

## 2025-02-05 PROCEDURE — 36415 COLL VENOUS BLD VENIPUNCTURE: CPT

## 2025-02-05 PROCEDURE — 84443 ASSAY THYROID STIM HORMONE: CPT

## 2025-02-06 NOTE — RESULT ENCOUNTER NOTE
Aurelianosuraj Joyce    Your TSH went up a bit, but T4 still normal. We could consider increase in Levothyroxine to 100 mcg if you are symptomatic or recheck again in another 1-2 months. Let me know how you would like to proceed.     The results are attached for your review.       Isai Olson PA-C

## 2025-02-10 ENCOUNTER — NURSE TRIAGE (OUTPATIENT)
Dept: FAMILY MEDICINE | Facility: OTHER | Age: 55
End: 2025-02-10
Payer: COMMERCIAL

## 2025-02-10 NOTE — TELEPHONE ENCOUNTER
"S-(situation): Dizziness    B-(background): Patient states she gets dizzy when lay in bed at night and rolls from one side to the other. This doesn't happen every time but did happen again last night. The dizziness doesn't last for long. She states she just closes her eyes and feels like the room is spinning but will eventually go away.     She explained a close friend of hers recently passed away and last night her BP was 139/100. Patient became tearful on phone. She stated, \"That blood pressure scared me. I am trying to take care of myself.\" And goes on to explain she has lost 15 lbs.     A-(assessment): Per RN protocol, patient should be seen in 2 wks.     R-(recommendations): Patient would like PCP recommendations on BP meds and if it is needed to increase levothyroxine or keep doing what she is doing and recheck soon? Patient states she trusts PCP judgement and if PCP thinks an increase of levothyroxine is necessary then she said PCP could send to pharmacy. No further questions or concerns at this time.      Reason for Disposition   Dizziness not present now, but is a chronic symptom (recurrent or ongoing AND lasting > 4 weeks)    Additional Information   Negative: SEVERE dizziness (e.g., unable to stand, requires support to walk, feels like passing out now)   Negative: SEVERE headache or neck pain   Negative: Spinning or tilting sensation (vertigo) present now and one or more stroke risk factors (i.e., hypertension, diabetes mellitus, prior stroke/TIA, heart attack, age over 60) (Exception: Prior physician evaluation for this AND no different/worse than usual.)   Negative: Neurologic deficit that was brief (now gone), ANY of the following:* Weakness of the face, arm, or leg on one side of the body* Numbness of the face, arm, or leg on one side of the body* Loss of speech or garbled speech   Negative: Loss of vision or double vision  (Exception: Similar to previous migraines.)   Negative: Extra heartbeats, " irregular heart beating, or heart is beating very fast (i.e., 'palpitations')   Negative: Difficulty breathing   Negative: Drinking very little and dehydration suspected (e.g., no urine > 12 hours, very dry mouth, very lightheaded)   Negative: Follows bleeding (e.g., stomach, rectum, vagina)  (Exception: Became dizzy from sight of small amount blood.)   Negative: Patient sounds very sick or weak to the triager   Negative: Chest pain   Negative: Rectal bleeding, bloody stool, or tarry-black stool   Negative: Vomiting is main symptom   Negative: Diarrhea is main symptom   Negative: Headache is main symptom   Negative: Heat exhaustion suspected (i.e., dehydration from heat exposure)   Negative: Patient states that they are having an anxiety or panic attack   Negative: Dizziness from low blood sugar (i.e., < 60 mg/dl or 3.5 mmol/l)   Negative: SEVERE difficulty breathing (e.g., struggling for each breath, speaks in single words)   Negative: Shock suspected (e.g., cold/pale/clammy skin, too weak to stand, low BP, rapid pulse)   Negative: Difficult to awaken or acting confused (e.g., disoriented, slurred speech)   Negative: Fainted, and still feels dizzy afterwards   Negative: Overdose (accidental or intentional) of medications   Negative: New neurologic deficit that is present now: * Weakness of the face, arm, or leg on one side of the body * Numbness of the face, arm, or leg on one side of the body * Loss of speech or garbled speech   Negative: Heart beating < 50 beats per minute OR > 140 beats per minute   Negative: Sounds like a life-threatening emergency to the triager   Negative: Lightheadedness (dizziness) present now, after 2 hours of rest and fluids   Negative: Spinning or tilting sensation (vertigo) present now   Negative: Fever > 103 F (39.4 C)   Negative: Fever > 100.0 F (37.8 C) and has diabetes mellitus or a weak immune system (e.g., HIV positive, cancer chemotherapy, organ transplant, splenectomy, chronic  steroids)   Negative: MODERATE dizziness (e.g., interferes with normal activities)  (Exception: Dizziness caused by heat exposure, sudden standing, or poor fluid intake.)   Negative: Vomiting occurs with dizziness   Negative: Patient wants to be seen   Negative: Taking a medicine that could cause dizziness (e.g., blood pressure medications, diuretics)   Negative: MILD dizziness (e.g., walking normally) and has NOT been evaluated by physician for this (Exception: Dizziness caused by heat exposure, sudden standing, or poor fluid intake.)   Negative: Substance use (drug use) or unhealthy alcohol use, known or suspected    Protocols used: Dizziness-A-OH

## 2025-02-10 NOTE — TELEPHONE ENCOUNTER
RN Triage    Patient Contact    Attempt # 1    RN did attempt to reach patient . No answer. Message left for patient  to call the clinic back and ask to speak to a member of the care team. Wanting to triage patient's dizziness.      Sonia Nowak RN on 2/10/2025 at 2:54 PM

## 2025-02-11 NOTE — TELEPHONE ENCOUNTER
Patient Contact    Attempt # 1 & 2    RN did attempt to reach patient. No answer. Message left for patient to call the clinic back and ask to speak to a member of the care team. Wanting to assist with scheduling per provider recommendation below.     Also sent [employer] message.      Johanna Campos, RN on 2/11/2025 at 9:02 AM

## 2025-02-12 NOTE — TELEPHONE ENCOUNTER
RN Triage    Patient Contact    Attempt # 1    RN did attempt to reach patient . No answer. Message left for patient  to call the clinic back and ask to speak to a member of the care team. Wanting to assist in making virtual appointment with provider.  Patient did read [employer] message and stated will call back 2/12 AM.     Sonia Nowak RN on 2/12/2025 at 10:37 AM

## 2025-02-22 ENCOUNTER — HEALTH MAINTENANCE LETTER (OUTPATIENT)
Age: 55
End: 2025-02-22

## 2025-03-05 ENCOUNTER — ANCILLARY PROCEDURE (OUTPATIENT)
Dept: MAMMOGRAPHY | Facility: CLINIC | Age: 55
End: 2025-03-05
Attending: PHYSICIAN ASSISTANT
Payer: COMMERCIAL

## 2025-03-05 DIAGNOSIS — Z12.31 VISIT FOR SCREENING MAMMOGRAM: ICD-10-CM

## 2025-03-05 PROCEDURE — 77063 BREAST TOMOSYNTHESIS BI: CPT | Performed by: RADIOLOGY

## 2025-03-05 PROCEDURE — 77067 SCR MAMMO BI INCL CAD: CPT | Performed by: RADIOLOGY

## 2025-03-19 ENCOUNTER — MYC REFILL (OUTPATIENT)
Dept: FAMILY MEDICINE | Facility: OTHER | Age: 55
End: 2025-03-19
Payer: COMMERCIAL

## 2025-03-19 DIAGNOSIS — E03.8 OTHER SPECIFIED HYPOTHYROIDISM: ICD-10-CM

## 2025-03-19 RX ORDER — LEVOTHYROXINE SODIUM 88 UG/1
88 TABLET ORAL DAILY
Qty: 90 TABLET | Refills: 3 | OUTPATIENT
Start: 2025-03-19

## 2025-03-20 NOTE — PROGRESS NOTES
Joyce Beck  :  1970  DOS: 3/21/2025  MRN: [id number]  PCP: No Ref-Primary, Physician    Sports Medicine Clinic Visit      Interim History - 2025  - Last seen on 2024 for bilateral knee primary osteoarthritis.   - Bilateral knee corticosteroid injection completed on 2024 provided mild relief for a few months. She notes that injections do not tend to give much relief in pain. 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 Takes oral diclofenac.  - No interim injury.       Initial Visit: 2024  HPI  Joyce Beck is a 54 year old female who is seen as a self referral presenting with bilateral knee pain, chronic.    - Mechanism of Injury:    - No inciting injury  - Pertinent history and prior evaluations:    - Previously seen with [facility] for bilateral primary knee osteoarthritis, worst in the right medial compartment.   - Has gotten bilateral Synvisc 1 injections on 2024.    - Also taking diclofenac for pain management.    - Corticosteroid injection of the right knee on 3/27/2024.    - XR R knee with weightbearing bilateral views in PA flexion shows moderate degenerative changes bilaterally, worst in the medial compartments, right greater than left.  No acute fractures or dislocations.  No significant knee joint effusion.  Prominent osteophytes in the superior patellofemoral compartment on the right.  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    - Pain Character:    - Pain in the anteromedial knees bilaterally that is worse with direct pressure, prolonged walking, squatting, getting in and out of chairs, and other activities.  Mild swelling occasionally.  Mild grinding, no major instability episodes, mechanical locking symptoms.  Denies neurogenic components, numbness, tingling, radicular shooting pain.  Antalgic weakness present.  Treatments tried are listed above.      Review of Systems  Musculoskeletal: as above  Remainder of review of systems is negative  including constitutional, CV, pulmonary, GI, Skin and Neurologic except as noted in HPI or medical history.    Past Medical History:   Diagnosis Date    Unspecified hypothyroidism      Past Surgical History:   Procedure Laterality Date    COLONOSCOPY N/A 2025    Procedure: Colonoscopy with biopsy;  Surgeon: Randall Adorno MD;  Location: [facility]OP[facility]       Family History   Problem Relation Age of Onset    Cancer No family hx of     Diabetes Paternal Grandfather     Diabetes Brother     Diabetes Sister     Heart Disease No family hx of     Heart Disease Maternal Grandfather         MI     Hypertension Paternal Grandfather     Cerebrovascular Disease No family hx of          Objective  There were no vitals taken for this visit.    General: healthy, alert and in no acute distress.    HEENT: no scleral icterus or conjunctival erythema.   Skin: no suspicious lesions or rash. No jaundice.   CV: regular rhythm by palpation, 2+ distal pulses.  Resp: normal respiratory effort without conversational dyspnea.   Psych: normal mood and affect.    Gait: nonantalgic, appropriate coordination and balance.     Neuro:        - Sensation to light touch:    - Intact throughout the BLE including all peripheral nerve distributions.        - MSR:      RLE  LLE  - Patella 2+ 2+  - Achilles 2+ 2+       - Special tests:   - Slump/SLR:  Neg    MSK - Knee:       - Inspection:    - No significant effusion, erythema, warmth, ecchymosis, lesion.        - ROM:    - Full AROM/PROM with pain during knee flexion/extension.        - Palpation:    - TTP at the medial joint line.  - NTTP elsewhere.        - Strength:  (*antalgic)  - Hip Flexion  5     - Hip Abduction 5    - Hip Adduction 5   - Knee Flexion  5   - Knee Extension 5   - Dorsiflexion  5   - Plantarflexion 5   - Ext. Jovi. Longus 5   - Inversion  5   - Eversion  5        - Special tests:        - Lachman:  Neg         - A/P drawer:  Neg        - Pivot  shift:  Neg    - Crissy:  Neg      - Varus stress:  Neg for laxity or pain     - Valgus stress:  Neg for laxity or pain    - Patellar grind: Positive   - Thessaly:  Neg     Radiology  I independently reviewed the available relevant imaging in the chart with my interpretations as above in HPI.       Assessment  1. Primary osteoarthritis of right knee    2. Primary osteoarthritis of left knee          Plan  Joyce Beck is a pleasant 54 year old female that presents for follow-up of her chronic bilateral knee pain secondary to primary osteoarthritis.  Previously managed with [facility] orthopedics with corticosteroid injections and Synvisc injections.  Last injection by myself on 8/17/2024.  Presents today to discuss additional injections and other treatment options.    In the interim, her right knee has been getting severely painful and worse.  Steroids seem to last last time with the right knee as well.  She endorses mechanical locking symptoms, clicking/popping, and instability of the right knee that also seems to be getting worse.  She is considering surgical options and would like to discuss what those would look like.  Ask specifically about arthroscopic surgery versus replacement.    We discussed the nature of the condition and available treatment options, and mutually agreed upon the following plan:    - Imaging:          - Reviewed and independently interpreted the relevant imaging in the chart, including any imaging ordered for today's clinic.  - Reviewed results and images with patient.   -MRI of the most symptomatic right knee order placed today.  Will follow-up when results of the test are available to discuss recommendations.  - Medications:          - Discussed pharmacologic options for pain relief.   - May use NSAIDs (Ibuprofen, Naproxen) or Acetaminophen (Tylenol) as needed for pain control.   - Do not take these if previously advised to avoid them for other medical conditions.  - May also use  topical medications such as lidocaine, IcyHot, BioFreeze, or Voltaren gel as needed for pain control.    - Voltaren gel is an anti-inflammatory cream that may be used up to 4 times per day over the painful area.   - Injections:          - Discussed possible injection options and alternatives.   -Intra-articular knee joint injection with corticosteroid, viscosupplementation, or PRP.  Good responses to corticosteroid injections in the past, including on 8/17/2024 with myself.  - Therapy:          -Continue home exercise program as instructed.  - Modalities:          - May use ice, heat, massage or other modalities as needed.   - Bracing:          - Discussed bracing options and may consider an  brace,  bracing trial referral has been placed and instructions given for scheduling.  - Surgery:          - Discussed non-operative and operative treatment options for the patient's condition.   - Goal is to continue conservative care for as long as possible before surgical intervention would need to be considered.  - Activity:          - Encouraged to remain active and participate in regular activities as symptoms allow.   Avoid or modify exacerbating activities as needed.  - Follow up:          - As needed for re-evaluation and update to treatment plan.  - May follow up sooner for new/worsening symptoms.  - May contact clinic by phone or MyChart for questions or concerns.       Neeraj Can DO, ALICIA  [facility] - Sports Medicine  [facility] - Department of Orthopedic Surgery       Disclaimer:  This note was prepared and written using Dragon Medical dictation software. As a result, there may be errors in the script that have gone undetected. Please consider this when interpreting the information in this note.

## 2025-03-21 ENCOUNTER — OFFICE VISIT (OUTPATIENT)
Dept: ORTHOPEDICS | Facility: CLINIC | Age: 55
End: 2025-03-21
Payer: COMMERCIAL

## 2025-03-21 DIAGNOSIS — M17.12 PRIMARY OSTEOARTHRITIS OF LEFT KNEE: ICD-10-CM

## 2025-03-21 DIAGNOSIS — M17.11 PRIMARY OSTEOARTHRITIS OF RIGHT KNEE: Primary | ICD-10-CM

## 2025-03-21 PROCEDURE — 99214 OFFICE O/P EST MOD 30 MIN: CPT | Performed by: STUDENT IN AN ORGANIZED HEALTH CARE EDUCATION/TRAINING PROGRAM

## 2025-03-21 NOTE — PATIENT INSTRUCTIONS
MRI Scheduling Instructions  Please follow both steps below    1.  Advanced imaging is done by appointment. Please call [facility] ([facility]/[place]) [phone] to schedule your MRI at your earliest convenience.   - Some insurance companies may require a prior authorization to be completed which can delay the time until you are able to schedule your appointment.     - If you are active on MyChart, you may have access to your test results before your provider is able to review the study and advise on next steps.      2. After the date of your MRI has been scheduled, please call [phone] to get on my schedule for an in-person or telephone follow up appointment to discuss the results and updated treatment recommendations. This follow up should be scheduled for 1-2 days after the date of your MRI.

## 2025-03-21 NOTE — LETTER
3/21/2025      Joyce Beck  [address] [place] [address]      Dear Colleague,    Thank you for referring your patient, Joyce Beck, to the [facility]. Please see a copy of my visit note below.    Joyce Beck  :  1970  DOS: 3/21/2025  MRN: [id number]  PCP: No Ref-Primary, Physician    Sports Medicine Clinic Visit      Interim History - 2025  - Last seen on 2024 for bilateral knee primary osteoarthritis.   - Bilateral knee corticosteroid injection completed on 2024 provided mild relief for a few months. She notes that injections do not tend to give much relief in pain. 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 Takes oral diclofenac.  - No interim injury.       Initial Visit: 2024  HPI  Joyce Beck is a 54 year old female who is seen as a self referral presenting with bilateral knee pain, chronic.    - Mechanism of Injury:    - No inciting injury  - Pertinent history and prior evaluations:    - Previously seen with [facility] for bilateral primary knee osteoarthritis, worst in the right medial compartment.   - Has gotten bilateral Synvisc 1 injections on 2024.    - Also taking diclofenac for pain management.    - Corticosteroid injection of the right knee on 3/27/2024.    - XR R knee with weightbearing bilateral views in PA flexion shows moderate degenerative changes bilaterally, worst in the medial compartments, right greater than left.  No acute fractures or dislocations.  No significant knee joint effusion.  Prominent osteophytes in the superior patellofemoral compartment on the right.  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    - Pain Character:    - Pain in the anteromedial knees bilaterally that is worse with direct pressure, prolonged walking, squatting, getting in and out of chairs, and other activities.  Mild swelling occasionally.  Mild grinding, no major instability episodes, mechanical  locking symptoms.  Denies neurogenic components, numbness, tingling, radicular shooting pain.  Antalgic weakness present.  Treatments tried are listed above.      Review of Systems  Musculoskeletal: as above  Remainder of review of systems is negative including constitutional, CV, pulmonary, GI, Skin and Neurologic except as noted in HPI or medical history.    Past Medical History:   Diagnosis Date     Unspecified hypothyroidism      Past Surgical History:   Procedure Laterality Date     COLONOSCOPY N/A 2025    Procedure: Colonoscopy with biopsy;  Surgeon: Randall Adorno MD;  [facility]       Family History   Problem Relation Age of Onset     Cancer No family hx of      Diabetes Paternal Grandfather      Diabetes Brother      Diabetes Sister      Heart Disease No family hx of      Heart Disease Maternal Grandfather         MI      Hypertension Paternal Grandfather      Cerebrovascular Disease No family hx of          Objective  There were no vitals taken for this visit.    General: healthy, alert and in no acute distress.    HEENT: no scleral icterus or conjunctival erythema.   Skin: no suspicious lesions or rash. No jaundice.   CV: regular rhythm by palpation, 2+ distal pulses.  Resp: normal respiratory effort without conversational dyspnea.   Psych: normal mood and affect.    Gait: nonantalgic, appropriate coordination and balance.     Neuro:        - Sensation to light touch:    - Intact throughout the BLE including all peripheral nerve distributions.        - MSR:      RLE  LLE  - Patella 2+ 2+  - Achilles 2+ 2+       - Special tests:   - Slump/SLR:  Neg    MSK - Knee:       - Inspection:    - No significant effusion, erythema, warmth, ecchymosis, lesion.        - ROM:    - Full AROM/PROM with pain during knee flexion/extension.        - Palpation:    - TTP at the medial joint line.  - NTTP elsewhere.        - Strength:  (*antalgic)  - Hip Flexion  5     - Hip  Abduction 5    - Hip Adduction 5   - Knee Flexion  5   - Knee Extension 5   - Dorsiflexion  5   - Plantarflexion 5   - Ext. Jovi. Longus 5   - Inversion  5   - Eversion  5        - Special tests:        - Lachman:  Neg         - A/P drawer:  Neg        - Pivot shift:  Neg    - Crissy:  Neg      - Varus stress:  Neg for laxity or pain     - Valgus stress:  Neg for laxity or pain    - Patellar grind: Positive   - Thessaly:  Neg     Radiology  I independently reviewed the available relevant imaging in the chart with my interpretations as above in HPI.       Assessment  1. Primary osteoarthritis of right knee    2. Primary osteoarthritis of left knee          Plan  Joyce Beck is a pleasant 54 year old female that presents for follow-up of her chronic bilateral knee pain secondary to primary osteoarthritis.  Previously managed with [facility] orthopedics with corticosteroid injections and Synvisc injections.  Last injection by myself on 8/17/2024.  Presents today to discuss additional injections and other treatment options.    In the interim, her right knee has been getting severely painful and worse.  Steroids seem to last last time with the right knee as well.  She endorses mechanical locking symptoms, clicking/popping, and instability of the right knee that also seems to be getting worse.  She is considering surgical options and would like to discuss what those would look like.  Ask specifically about arthroscopic surgery versus replacement.    We discussed the nature of the condition and available treatment options, and mutually agreed upon the following plan:    - Imaging:          - Reviewed and independently interpreted the relevant imaging in the chart, including any imaging ordered for today's clinic.  - Reviewed results and images with patient.   -MRI of the most symptomatic right knee order placed today.  Will follow-up when results of the test are available to discuss recommendations.  - Medications:           - Discussed pharmacologic options for pain relief.   - May use NSAIDs (Ibuprofen, Naproxen) or Acetaminophen (Tylenol) as needed for pain control.   - Do not take these if previously advised to avoid them for other medical conditions.  - May also use topical medications such as lidocaine, IcyHot, BioFreeze, or Voltaren gel as needed for pain control.    - Voltaren gel is an anti-inflammatory cream that may be used up to 4 times per day over the painful area.   - Injections:          - Discussed possible injection options and alternatives.   -Intra-articular knee joint injection with corticosteroid, viscosupplementation, or PRP.  Good responses to corticosteroid injections in the past, including on 8/17/2024 with myself.  - Therapy:          -Continue home exercise program as instructed.  - Modalities:          - May use ice, heat, massage or other modalities as needed.   - Bracing:          - Discussed bracing options and may consider an  brace,  bracing trial referral has been placed and instructions given for scheduling.  - Surgery:          - Discussed non-operative and operative treatment options for the patient's condition.   - Goal is to continue conservative care for as long as possible before surgical intervention would need to be considered.  - Activity:          - Encouraged to remain active and participate in regular activities as symptoms allow.   Avoid or modify exacerbating activities as needed.  - Follow up:          - As needed for re-evaluation and update to treatment plan.  - May follow up sooner for new/worsening symptoms.  - May contact clinic by phone or MyChart for questions or concerns.       Neeraj Can DO, CAQSM  [facility] - Sports Medicine  [facility] - Department of Orthopedic Surgery       Disclaimer:  This note was prepared and written using Dragon Medical dictation software. As a result, there may be errors in the script that  have gone undetected. Please consider this when interpreting the information in this note.       Again, thank you for allowing me to participate in the care of your patient.        Sincerely,        Neeraj Can, DO    Electronically signed

## 2025-03-24 NOTE — PROGRESS NOTES
Joyce Beck  :  1970  DOS: 2025  MRN: [id number]  PCP: No Ref-Primary, Physician    Sports Medicine Clinic Visit      Interim History - 2025  - Last seen on 3/21/2025 for bilateral knee primary osteoarthritis, with worsening pain and instability and mechanical symptoms in the right knee..     - 3/31/2025 MRI right knee shows   1. Marked osteoarthrosis with scattered areas of high-grade cartilage  loss, most severe at the medial femorotibial surface and slightly  lesser extent in the patellofemoral joint compartment.     2. Degenerative tearing of the posterior horn of the medial meniscus  with a radial component and peripheral extrusion.     3. ACL, posterior cruciate ligament, medial and lateral supporting  structures and lateral meniscus are grossly intact.    - Since the last visit, she notes no change in symptoms.   - No interim injury.       Interim History - 2025  - Last seen on 2024 for bilateral knee primary osteoarthritis.   - Bilateral knee corticosteroid injection completed on 2024 provided mild relief for a few months. She notes that injections do not tend to give much relief in pain. 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 Takes oral diclofenac.  - No interim injury.       Initial Visit: 2024  HPI  Joyce Beck is a 54 year old female who is seen as a self referral presenting with bilateral knee pain, chronic.    - Mechanism of Injury:    - No inciting injury  - Pertinent history and prior evaluations:    - Previously seen with [facility] for bilateral primary knee osteoarthritis, worst in the right medial compartment.   - Has gotten bilateral Synvisc 1 injections on 2024.    - Also taking diclofenac for pain management.    - Corticosteroid injection of the right knee on 3/27/2024.    - XR R knee with weightbearing bilateral views in PA flexion shows moderate degenerative changes bilaterally, worst in the medial compartments,  right greater than left.  No acute fractures or dislocations.  No significant knee joint effusion.  Prominent osteophytes in the superior patellofemoral compartment on the right.  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    - Pain Character:    - Pain in the anteromedial knees bilaterally that is worse with direct pressure, prolonged walking, squatting, getting in and out of chairs, and other activities.  Mild swelling occasionally.  Mild grinding, no major instability episodes, mechanical locking symptoms.  Denies neurogenic components, numbness, tingling, radicular shooting pain.  Antalgic weakness present.  Treatments tried are listed above.      Review of Systems  Musculoskeletal: as above  Remainder of review of systems is negative including constitutional, CV, pulmonary, GI, Skin and Neurologic except as noted in HPI or medical history.    Past Medical History:   Diagnosis Date    Unspecified hypothyroidism      Past Surgical History:   Procedure Laterality Date    COLONOSCOPY N/A 2025    Procedure: Colonoscopy with biopsy;  Surgeon: Randall Adorno MD;  Location: [facility] LIGATE FALLOPIAN TUBE       Family History   Problem Relation Age of Onset    Cancer No family hx of     Diabetes Paternal Grandfather     Diabetes Brother     Diabetes Sister     Heart Disease No family hx of     Heart Disease Maternal Grandfather         MI     Hypertension Paternal Grandfather     Cerebrovascular Disease No family hx of          Objective  There were no vitals taken for this visit.    General: healthy, alert and in no acute distress.    HEENT: no scleral icterus or conjunctival erythema.   Skin: no suspicious lesions or rash. No jaundice.   CV: regular rhythm by palpation, 2+ distal pulses.  Resp: normal respiratory effort without conversational dyspnea.   Psych: normal mood and affect.    Gait: nonantalgic, appropriate coordination and balance.     Neuro:        - Sensation to light touch:    - Intact  throughout the BLE including all peripheral nerve distributions.        - MSR:      RLE  LLE  - Patella 2+ 2+  - Achilles 2+ 2+       - Special tests:   - Slump/SLR:  Neg    MSK - Knee:       - Inspection:    - No significant effusion, erythema, warmth, ecchymosis, lesion.        - ROM:    - Full AROM/PROM with pain during knee flexion/extension.        - Palpation:    - TTP at the medial joint line.  - NTTP elsewhere.        - Strength:  (*antalgic)  - Hip Flexion  5     - Hip Abduction 5    - Hip Adduction 5   - Knee Flexion  5   - Knee Extension 5   - Dorsiflexion  5   - Plantarflexion 5   - Ext. Jovi. Longus 5   - Inversion  5   - Eversion  5        - Special tests:        - Lachman:  Neg         - A/P drawer:  Neg        - Pivot shift:  Neg    - Crissy:  Neg      - Varus stress:  Neg for laxity or pain     - Valgus stress:  Neg for laxity or pain    - Patellar grind: Positive   - Thessaly:  Neg         Radiology  I independently reviewed the available relevant imaging in the chart with my interpretations as above in HPI.       Procedure  Large Joint Injection/Arthocentesis: R knee joint    Date/Time: 4/1/2025 4:57 PM    Performed by: Neeraj Can DO  Authorized by: Neeraj Can DO    Indications:  Osteoarthritis  Needle Size:  22 G  Guidance: ultrasound    Approach:  Anterolateral  Location:  Knee      Medications:  40 mg triamcinolone 40 MG/ML; 2 mL lidocaine 1 %; 2 mL BUPivacaine 0.5 %  Outcome:  Tolerated well, no immediate complications  Procedure discussed: discussed risks, benefits, and alternatives    Consent Given by:  Patient  Prep: patient was prepped and draped in usual sterile fashion     Ultrasound images of procedure were permanently stored.       Ultrasound Guided Intra-articular Knee Injection  The knee was prepped and draped in a sterile manner.  Ultrasound identification of the patella, suprapatellar pouch, quadriceps tendon and femur in both long and short axis was obtained. The probe  was placed in short axis to the femur. A 1.5 inch 22 gauge needle was placed under ultrasound guidance into the superior knee joint using a lateral approach.  A mixture of 2 mL of 1% lidocaine, 2 mL of 0.5% bupivacaine and 1 ml kenalog (40mg/ml) was injected without difficulty. The needle was removed and there was good hemostasis without complications.  Patient tolerated procedure well.  There was ultrasound documentation of needle placement and injection. The procedure was duplicated on the contralateral side using an identical protocol.         Assessment  1. Primary osteoarthritis of right knee    2. Complex tear of medial meniscus of right knee as current injury, subsequent encounter          Plan  Joyce Beck is a pleasant 54 year old female that presents for follow-up of her chronic bilateral knee pain secondary to primary osteoarthritis.  Previously managed with [facility] orthopedics with corticosteroid injections and Synvisc injections.  Last injection by myself on 8/17/2024.  Presents today to discuss additional injections and other treatment options.     In the interim, her right knee has been getting severely painful and worse.  Steroids seem to last last time with the right knee as well.  She endorses mechanical locking symptoms, clicking/popping, and instability of the right knee that also seems to be getting worse.  She is considering surgical options and would like to discuss what those would look like.  Ask specifically about arthroscopic surgery versus replacement.     We discussed the nature of the condition and available treatment options, and mutually agreed upon the following plan:     - Imaging:                - Reviewed and independently interpreted the relevant imaging in the chart, including any imaging ordered for today's clinic.  - Reviewed results and images with patient.   -MRI of the most symptomatic right knee order placed today.  Will follow-up when results of the test are  available to discuss recommendations.  - Medications:                - Discussed pharmacologic options for pain relief.   - May use NSAIDs (Ibuprofen, Naproxen) or Acetaminophen (Tylenol) as needed for pain control.   - Do not take these if previously advised to avoid them for other medical conditions.  - May also use topical medications such as lidocaine, IcyHot, BioFreeze, or Voltaren gel as needed for pain control.               - Voltaren gel is an anti-inflammatory cream that may be used up to 4 times per day over the painful area.   - Injections:                - Discussed possible injection options and alternatives.   -Intra-articular knee joint injection with corticosteroid, viscosupplementation, or PRP.  Good responses to corticosteroid injections in the past, including on 8/17/2024 with myself.  - Therapy:                -Continue home exercise program as instructed.  - Modalities:                - May use ice, heat, massage or other modalities as needed.   - Bracing:                - Discussed bracing options and may consider an  brace,  bracing trial referral has been placed and instructions given for scheduling.  - Surgery:                - Discussed non-operative and operative treatment options for the patient's condition.   - Goal is to continue conservative care for as long as possible before surgical intervention would need to be considered.  - Activity:                - Encouraged to remain active and participate in regular activities as symptoms allow.   Avoid or modify exacerbating activities as needed.  - Follow up:                - As needed for re-evaluation and update to treatment plan.  - May follow up sooner for new/worsening symptoms.  - May contact clinic by phone or MyChart for questions or concerns.        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  Based on the high-grade cartilage damage and peripherally extruded medial meniscus, she is essentially bone-on-bone in the medial compartment, which correlates well with her severity of her pain and debility.  We discussed available nonoperative and operative treatment options.  Available nonoperative treatment options including injections, bracing, physical therapy, home exercise program, oral and topical anti-inflammatories and other pain medications, modalities, activity modifications may help with pain control, but I expect her instability and mechanical symptoms to continue to worsen with this treatment plan.  She is more in favor of operative intervention in the form of replacement, as I believe an arthroscopic procedure would likely not provide her enough long-term relief, and would recommend arthroplasty.  Orthopedic  referral was placed today for Dr. Montoya to consider arthroplasty.  In the meantime, she would like relief of her symptoms and requested another corticosteroid injection today.  We discussed surgical delay after injections of at least 3 months, and she is okay with this plan.  Ultrasound-guided right knee joint corticosteroid injection performed today without complication.  See procedure note above for details.  She will follow-up with Dr. Montoya to discuss arthroplasty and let me know if she would like any additional ultrasound-guided injections in the future if she decides to delay surgery.      Neeraj Can DO, CAQSM  [facility] Sports Medicine  [facility] - Department of Orthopedic Surgery       Disclaimer:  This note was prepared and written using Dragon Medical dictation software. As a result, there may be errors in the script that have gone undetected. Please consider this when interpreting the information in this note.

## 2025-03-31 ENCOUNTER — HOSPITAL ENCOUNTER (OUTPATIENT)
Dept: MRI IMAGING | Facility: CLINIC | Age: 55
Discharge: HOME | End: 2025-03-31
Attending: STUDENT IN AN ORGANIZED HEALTH CARE EDUCATION/TRAINING PROGRAM | Admitting: STUDENT IN AN ORGANIZED HEALTH CARE EDUCATION/TRAINING PROGRAM
Payer: COMMERCIAL

## 2025-03-31 DIAGNOSIS — M17.11 PRIMARY OSTEOARTHRITIS OF RIGHT KNEE: ICD-10-CM

## 2025-03-31 PROCEDURE — 73721 MRI JNT OF LWR EXTRE W/O DYE: CPT | Mod: 26 | Performed by: RADIOLOGY

## 2025-03-31 PROCEDURE — 73721 MRI JNT OF LWR EXTRE W/O DYE: CPT | Mod: RT

## 2025-04-01 ENCOUNTER — OFFICE VISIT (OUTPATIENT)
Dept: ORTHOPEDICS | Facility: CLINIC | Age: 55
End: 2025-04-01
Payer: COMMERCIAL

## 2025-04-01 DIAGNOSIS — S83.231D COMPLEX TEAR OF MEDIAL MENISCUS OF RIGHT KNEE AS CURRENT INJURY, SUBSEQUENT ENCOUNTER: ICD-10-CM

## 2025-04-01 DIAGNOSIS — M17.11 PRIMARY OSTEOARTHRITIS OF RIGHT KNEE: Primary | ICD-10-CM

## 2025-04-01 PROCEDURE — 20611 DRAIN/INJ JOINT/BURSA W/US: CPT | Mod: RT | Performed by: STUDENT IN AN ORGANIZED HEALTH CARE EDUCATION/TRAINING PROGRAM

## 2025-04-01 PROCEDURE — 99214 OFFICE O/P EST MOD 30 MIN: CPT | Mod: 25 | Performed by: STUDENT IN AN ORGANIZED HEALTH CARE EDUCATION/TRAINING PROGRAM

## 2025-04-01 RX ORDER — BUPIVACAINE HYDROCHLORIDE 5 MG/ML
2 INJECTION, SOLUTION PERINEURAL
Status: COMPLETED | OUTPATIENT
Start: 2025-04-01 | End: 2025-04-01

## 2025-04-01 RX ORDER — LIDOCAINE HYDROCHLORIDE 10 MG/ML
2 INJECTION, SOLUTION INFILTRATION; PERINEURAL
Status: COMPLETED | OUTPATIENT
Start: 2025-04-01 | End: 2025-04-01

## 2025-04-01 RX ORDER — TRIAMCINOLONE ACETONIDE 40 MG/ML
40 INJECTION, SUSPENSION INTRA-ARTICULAR; INTRAMUSCULAR
Status: COMPLETED | OUTPATIENT
Start: 2025-04-01 | End: 2025-04-01

## 2025-04-01 RX ADMIN — BUPIVACAINE HYDROCHLORIDE 2 ML: 5 INJECTION, SOLUTION PERINEURAL at 16:57

## 2025-04-01 RX ADMIN — LIDOCAINE HYDROCHLORIDE 2 ML: 10 INJECTION, SOLUTION INFILTRATION; PERINEURAL at 16:57

## 2025-04-01 RX ADMIN — TRIAMCINOLONE ACETONIDE 40 MG: 40 INJECTION, SUSPENSION INTRA-ARTICULAR; INTRAMUSCULAR at 16:57

## 2025-04-01 NOTE — LETTER
2025      Joyce Beck  [address] [address] [address]      Dear Colleague,    Thank you for referring your patient, Joyce Beck, to the [facility] [place]. Please see a copy of my visit note below.    Joyce Beck  :  1970  DOS: 2025  MRN: [id number]  PCP: No Ref-Primary, Physician    Sports Medicine Clinic Visit      Interim History - 2025  - Last seen on 3/21/2025 for bilateral knee primary osteoarthritis, with worsening pain and instability and mechanical symptoms in the right knee..     - 3/31/2025 MRI right knee shows   1. Marked osteoarthrosis with scattered areas of high-grade cartilage  loss, most severe at the medial femorotibial surface and slightly  lesser extent in the patellofemoral joint compartment.     2. Degenerative tearing of the posterior horn of the medial meniscus  with a radial component and peripheral extrusion.     3. ACL, posterior cruciate ligament, medial and lateral supporting  structures and lateral meniscus are grossly intact.    - Since the last visit, she notes no change in symptoms.   - No interim injury.       Interim History - 2025  - Last seen on 2024 for bilateral knee primary osteoarthritis.   - Bilateral knee corticosteroid injection completed on 2024 provided mild relief for a few months. She notes that injections do not tend to give much relief in pain. 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 Takes oral diclofenac.  - No interim injury.       Initial Visit: 2024  HPI  Joyce Beck is a 54 year old female who is seen as a self referral presenting with bilateral knee pain, chronic.    - Mechanism of Injury:    - No inciting injury  - Pertinent history and prior evaluations:    - Previously seen with [facility] for bilateral primary knee osteoarthritis, worst in the right medial compartment.   - Has gotten bilateral Synvisc 1 injections on 2024.    - Also  taking diclofenac for pain management.    - Corticosteroid injection of the right knee on 3/27/2024.    - XR R knee with weightbearing bilateral views in PA flexion shows moderate degenerative changes bilaterally, worst in the medial compartments, right greater than left.  No acute fractures or dislocations.  No significant knee joint effusion.  Prominent osteophytes in the superior patellofemoral compartment on the right.  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    - Pain Character:    - Pain in the anteromedial knees bilaterally that is worse with direct pressure, prolonged walking, squatting, getting in and out of chairs, and other activities.  Mild swelling occasionally.  Mild grinding, no major instability episodes, mechanical locking symptoms.  Denies neurogenic components, numbness, tingling, radicular shooting pain.  Antalgic weakness present.  Treatments tried are listed above.      Review of Systems  Musculoskeletal: as above  Remainder of review of systems is negative including constitutional, CV, pulmonary, GI, Skin and Neurologic except as noted in HPI or medical history.    Past Medical History:   Diagnosis Date     Unspecified hypothyroidism      Past Surgical History:   Procedure Laterality Date     COLONOSCOPY N/A 2025    Procedure: Colonoscopy with biopsy;  Surgeon: Randall Adorno MD;  Location: [facility] LIGATE FALLOPIAN TUBE       Family History   Problem Relation Age of Onset     Cancer No family hx of      Diabetes Paternal Grandfather      Diabetes Brother      Diabetes Sister      Heart Disease No family hx of      Heart Disease Maternal Grandfather         MI      Hypertension Paternal Grandfather      Cerebrovascular Disease No family hx of          Objective  There were no vitals taken for this visit.    General: healthy, alert and in no acute distress.    HEENT: no scleral icterus or conjunctival erythema.   Skin: no suspicious lesions or rash. No jaundice.   CV:  regular rhythm by palpation, 2+ distal pulses.  Resp: normal respiratory effort without conversational dyspnea.   Psych: normal mood and affect.    Gait: nonantalgic, appropriate coordination and balance.     Neuro:        - Sensation to light touch:    - Intact throughout the BLE including all peripheral nerve distributions.        - MSR:      RLE  LLE  - Patella 2+ 2+  - Achilles 2+ 2+       - Special tests:   - Slump/SLR:  Neg    MSK - Knee:       - Inspection:    - No significant effusion, erythema, warmth, ecchymosis, lesion.        - ROM:    - Full AROM/PROM with pain during knee flexion/extension.        - Palpation:    - TTP at the medial joint line.  - NTTP elsewhere.        - Strength:  (*antalgic)  - Hip Flexion  5     - Hip Abduction 5    - Hip Adduction 5   - Knee Flexion  5   - Knee Extension 5   - Dorsiflexion  5   - Plantarflexion 5   - Ext. Jovi. Longus 5   - Inversion  5   - Eversion  5        - Special tests:        - Lachman:  Neg         - A/P drawer:  Neg        - Pivot shift:  Neg    - Crissy:  Neg      - Varus stress:  Neg for laxity or pain     - Valgus stress:  Neg for laxity or pain    - Patellar grind: Positive   - Thessaly:  Neg         Radiology  I independently reviewed the available relevant imaging in the chart with my interpretations as above in HPI.       Procedure  Large Joint Injection/Arthocentesis: R knee joint    Date/Time: 4/1/2025 4:57 PM    Performed by: Neeraj Can DO  Authorized by: Neeraj Can DO    Indications:  Osteoarthritis  Needle Size:  22 G  Guidance: ultrasound    Approach:  Anterolateral  Location:  Knee      Medications:  40 mg triamcinolone 40 MG/ML; 2 mL lidocaine 1 %; 2 mL BUPivacaine 0.5 %  Outcome:  Tolerated well, no immediate complications  Procedure discussed: discussed risks, benefits, and alternatives    Consent Given by:  Patient  Prep: patient was prepped and draped in usual sterile fashion     Ultrasound images of procedure were  permanently stored.       Ultrasound Guided Intra-articular Knee Injection  The knee was prepped and draped in a sterile manner.  Ultrasound identification of the patella, suprapatellar pouch, quadriceps tendon and femur in both long and short axis was obtained. The probe was placed in short axis to the femur. A 1.5 inch 22 gauge needle was placed under ultrasound guidance into the superior knee joint using a lateral approach.  A mixture of 2 mL of 1% lidocaine, 2 mL of 0.5% bupivacaine and 1 ml kenalog (40mg/ml) was injected without difficulty. The needle was removed and there was good hemostasis without complications.  Patient tolerated procedure well.  There was ultrasound documentation of needle placement and injection. The procedure was duplicated on the contralateral side using an identical protocol.         Assessment  1. Primary osteoarthritis of right knee    2. Complex tear of medial meniscus of right knee as current injury, subsequent encounter          Plan  Joyce Beck is a pleasant 54 year old female that presents for follow-up of her chronic bilateral knee pain secondary to primary osteoarthritis.  Previously managed with [facility] orthopedics with corticosteroid injections and Synvisc injections.  Last injection by myself on 8/17/2024.  Presents today to discuss additional injections and other treatment options.     In the interim, her right knee has been getting severely painful and worse.  Steroids seem to last last time with the right knee as well.  She endorses mechanical locking symptoms, clicking/popping, and instability of the right knee that also seems to be getting worse.  She is considering surgical options and would like to discuss what those would look like.  Ask specifically about arthroscopic surgery versus replacement.     We discussed the nature of the condition and available treatment options, and mutually agreed upon the following plan:     - Imaging:                -  Reviewed and independently interpreted the relevant imaging in the chart, including any imaging ordered for today's clinic.  - Reviewed results and images with patient.   -MRI of the most symptomatic right knee order placed today.  Will follow-up when results of the test are available to discuss recommendations.  - Medications:                - Discussed pharmacologic options for pain relief.   - May use NSAIDs (Ibuprofen, Naproxen) or Acetaminophen (Tylenol) as needed for pain control.   - Do not take these if previously advised to avoid them for other medical conditions.  - May also use topical medications such as lidocaine, IcyHot, BioFreeze, or Voltaren gel as needed for pain control.               - Voltaren gel is an anti-inflammatory cream that may be used up to 4 times per day over the painful area.   - Injections:                - Discussed possible injection options and alternatives.   -Intra-articular knee joint injection with corticosteroid, viscosupplementation, or PRP.  Good responses to corticosteroid injections in the past, including on 8/17/2024 with myself.  - Therapy:                -Continue home exercise program as instructed.  - Modalities:                - May use ice, heat, massage or other modalities as needed.   - Bracing:                - Discussed bracing options and may consider an  brace,  bracing trial referral has been placed and instructions given for scheduling.  - Surgery:                - Discussed non-operative and operative treatment options for the patient's condition.   - Goal is to continue conservative care for as long as possible before surgical intervention would need to be considered.  - Activity:                - Encouraged to remain active and participate in regular activities as symptoms allow.   Avoid or modify exacerbating activities as needed.  - Follow up:                - As needed for re-evaluation and update to treatment plan.  - May follow up  sooner for new/worsening symptoms.  - May contact clinic by phone or MyChart for questions or concerns.        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  Based on the high-grade cartilage damage and peripherally extruded medial meniscus, she is essentially bone-on-bone in the medial compartment, which correlates well with her severity of her pain and debility.  We discussed available nonoperative and operative treatment options.  Available nonoperative treatment options including injections, bracing, physical therapy, home exercise program, oral and topical anti-inflammatories and other pain medications, modalities, activity modifications may help with pain control, but I expect her instability and mechanical symptoms to continue to worsen with this treatment plan.  She is more in favor of operative intervention in the form of replacement, as I believe an arthroscopic procedure would likely not provide her enough long-term relief, and would recommend arthroplasty.  Orthopedic  referral was placed today for Dr. Montoya to consider arthroplasty.  In the meantime, she would like relief of her symptoms and requested another corticosteroid injection today.  We discussed surgical delay after injections of at least 3 months, and she is okay with this plan.  Ultrasound-guided right knee joint corticosteroid injection performed today without complication.  See procedure note above for details.  She will follow-up with Dr. Montoya to discuss arthroplasty and let me know if she would like any additional ultrasound-guided injections in the future if she decides to delay surgery.      Neeraj Can DO, CAQSM  [facility] - Sports Medicine  [facility] - Department of  Orthopedic Surgery       Disclaimer:  This note was prepared and written using Dragon Medical dictation software. As a result, there may be errors in the script that have gone undetected. Please consider this when interpreting the information in this note.       Again, thank you for allowing me to participate in the care of your patient.        Sincerely,        Neeraj Can, DO    Electronically signed

## 2025-04-02 ENCOUNTER — PATIENT OUTREACH (OUTPATIENT)
Dept: CARE COORDINATION | Facility: CLINIC | Age: 55
End: 2025-04-02
Payer: COMMERCIAL

## 2025-04-08 NOTE — PROGRESS NOTES
ORTHOPEDIC CONSULT      Chief Complaint: Joyce Bcek is a 55 year old female who is being seen for   Chief Complaint   Patient presents with    Right Knee - Pain   Joyce Beck is a 55 year old female who is seen in consultation at the request of Dr Can for evaluation of right knee pain.      History of Present Illness:   Many year history of slowly progressive right greater than left knee pain.  Pain is mostly medial, but will be global at times. No radiating. Constant. Can be severe at times. Was recently seen by Dr. Can and underwent a ultrasound-guided right knee intra-articular corticosteroid injection on April 1, 2025.  She is also had a history of bilateral Synvisc 1 injections January 5, 2024. Has also used multiple types of bracing, rest, activity modification, weight loss and voltaren. Despite this the knees continue to get worse. 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 Also endorses the knees giving out, has had previous falls, and locking at times. She notes the pain is causing fatigue. Reports has very minimal ability to walk distances due to the pain.   No previous surgeries to the knees.       Patient's past medical, surgical, social and family histories reviewed.     Past Medical History:   Diagnosis Date    Unspecified hypothyroidism        Past Surgical History:   Procedure Laterality Date    COLONOSCOPY N/A 1/24/2025    Procedure: Colonoscopy with biopsy;  Surgeon: Randall Adorno MD;  Location: [facility]         Medications:  Current Outpatient Medications   Medication Sig Dispense Refill    diclofenac (VOLTAREN) 75 MG EC tablet Take 1 tablet (75 mg) by mouth 2 times daily as needed for moderate pain. 180 tablet 3    levothyroxine (SYNTHROID/LEVOTHROID) 88 MCG tablet Take 1 tablet (88 mcg) by mouth daily. 90 tablet 3     No current facility-administered medications for this visit.  "      Allergies   Allergen Reactions    Penicillins Swelling       Social History     Occupational History    Not on file   Tobacco Use    Smoking status: Never     Passive exposure: Never    Smokeless tobacco: Never   Vaping Use    Vaping status: Never Used   Substance and Sexual Activity    Alcohol use: No    Drug use: No    Sexual activity: Yes     Birth control/protection: Surgical       Family History   Problem Relation Age of Onset    Cancer No family hx of     Diabetes Paternal Grandfather     Diabetes Brother     Diabetes Sister     Heart Disease No family hx of     Heart Disease Maternal Grandfather         MI     Hypertension Paternal Grandfather     Cerebrovascular Disease No family hx of        REVIEW OF SYSTEMS  10 point review systems performed otherwise negative as noted as per history of present illness.    Physical Exam:  Vitals: Ht 1.633 m (5' 4.29\")   Wt 95.5 kg (210 lb 8 oz)   BMI 35.81 kg/m    BMI= Body mass index is 35.81 kg/m .  Constitutional: healthy, alert and no acute distress   Psychiatric: mentation appears normal and affect normal/bright  NEURO: no focal deficits  RESP: Normal with easy respirations and no use of accessory muscles to breathe, no audible wheezing or retractions  CV: No peripheral edema         Regular rate and rhythm by palpation  SKIN: No erythema, rashes, excoriation, or breakdown. No evidence of infection.   JOINT/EXTREMITIES:bilateral knees: No gross deformity although some varus alignment.  Tenderness along medial joint line.  Small effusion.  Pseudolaxity medial with valgus stressing.  Collateral ligaments are intact.  Active motion is 0-130 degrees motion.  Patellas track midline.  No pain at the hip with range of motion.     GAIT: not tested     Diagnostic Modalities:  Right knee x-rays: Weightbearing views taken in the clinic today shows severe medial compartment bone-on-bone osteoarthritis.  Subchondral sclerotic changes as well as large osteophytes " noted.  Patellofemoral shows some moderate lateral facet narrowing with some rimming osteophytes.  Lateral radiograph shows some suprapatellar calcifications.    MR right knee without contrast 3/31/2025 9:33 AM     Techniques: Multiplanar multisequence imaging of the right knee was  obtained without administration of intra-articular or intravenous  contrast using routing protocol.     History: Primary OA with severe medial sided knee pain, mechanical  clicking/locking, instability.; Primary osteoarthritis of right knee     Comparison: Bilateral knee x-ray 3/27/2024     Findings:     MENISCI:  Medial meniscus: Radial meniscal tear at the posterior horn.  Lateral meniscus: Intact.     LIGAMENTS  Cruciate ligaments: ACL demonstrates increased signal, grossly intact.  Medial supporting structures: Intact.  Lateral supporting structures: Intact.     EXTENSOR MECHANISM  Intact.     FLUID  Small joint effusion. No substantial Baker's cyst.     OSSEOUS and ARTICULAR STRUCTURES  Bones: Extensive osteophyte formation including the trochlear,  patellofemoral surfaces. No fracture, contusion, or osseous lesion is  seen.     Patellofemoral compartment: Scattered cartilage loss. Joint body in  the suprapatellar recess, new compared to prior x-ray 2024.  Medial compartment: Diffuse full-thickness cartilage loss at the  femorotibial surface.     Lateral compartment: Severe fissuring with cartilage loss of anterior  trochlear surface.     ANCILLARY FINDINGS  None.                                                                      Impression:  1. Marked osteoarthrosis with scattered areas of high-grade cartilage  loss, most severe at the medial femorotibial surface and slightly  lesser extent in the patellofemoral joint compartment.     2. Degenerative tearing of the posterior horn of the medial meniscus  with a radial component and peripheral extrusion.     3. ACL, posterior cruciate ligament, medial and lateral  supporting  structures and lateral meniscus are grossly intact.    Independent visualization of the images was performed.      Impression: right knee primary osteoarthritis    Plan:  All of the above pertinent physical exam and imaging modalities findings was reviewed with Joyce.                                           ELECTIVE SURGERY:  The patient has attempted conservative treatments that include ultrasound-guided right knee intra-articular corticosteroid injection, bilateral Synvisc 1 injections. Has also used multiple types of bracing, rest, activity modification, weight loss and voltaren. Despite this the knees continue to get worse. 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 Also endorses the knees giving out, has had previous falls, and locking at times. She notes the pain is causing fatigue. Reports has very minimal ability to walk distances due to the pain.  Secondary to these reasons I have offered surgery in the form of Right ariana assisted total knee replacement.     Risks, benefits, complications, alternatives, limitations, and post operative course were discussed. Risks including infection (requiring long-term antibiotics and repeat surgeries), implant loosening (requiring revision surgery), heart attacks, strokes, bleeding (possibly requiring blood transfusion), scars, instability, numbness around the knee, stiffness (requiring manipulations or repeat surgeries), instability and dislocations, fractures, blood clots the legs and lungs, nerve injury (possibly leading to foot drop).  Postoperative course was discussed as far as limitations and expected recovery times. It was also discussed that after surgery there is a need for blood thinners to prevent blood clots, but even when being treated it is possible to develop a blood clot.  Anticipate Fast [facility] discharge.  We discussed physical therapy being an important component of postop  success.  Past medical and surgical history was reviewed.  Ms. Beck will need a pre-operative medical evaluation and clearance by a primary care provider. We will obtain a CBC and the appropriate radiographs prior to surgery. I will leave it to the discretion of the primary care provider for additional pre-operative testing.   Due to intraarticular injection surgery will be anytime after July 1st.     Understands need for CT  Physical therapy through [place] [facility] (her first choice, if insurance does not cover [facility])  ASA for Dvt prevention  Fast [facility] - will have someone to stay with her.         Return to clinic 1 week prior to surgery, 14 days postop, or sooner as needed for changes.  Re-x-ray on return: Yes. Postop visit    Hiram Montoya D.O.

## 2025-04-09 ENCOUNTER — OFFICE VISIT (OUTPATIENT)
Dept: ORTHOPEDICS | Facility: CLINIC | Age: 55
End: 2025-04-09
Attending: STUDENT IN AN ORGANIZED HEALTH CARE EDUCATION/TRAINING PROGRAM
Payer: COMMERCIAL

## 2025-04-09 ENCOUNTER — ANCILLARY PROCEDURE (OUTPATIENT)
Dept: GENERAL RADIOLOGY | Facility: CLINIC | Age: 55
End: 2025-04-09
Attending: NURSE PRACTITIONER
Payer: COMMERCIAL

## 2025-04-09 VITALS — BODY MASS INDEX: 35.94 KG/M2 | WEIGHT: 210.5 LBS | HEIGHT: 64 IN

## 2025-04-09 DIAGNOSIS — M17.11 PRIMARY OSTEOARTHRITIS OF RIGHT KNEE: ICD-10-CM

## 2025-04-09 DIAGNOSIS — M25.561 RIGHT KNEE PAIN: ICD-10-CM

## 2025-04-09 DIAGNOSIS — M25.561 RIGHT KNEE PAIN: Primary | ICD-10-CM

## 2025-04-09 PROCEDURE — 99204 OFFICE O/P NEW MOD 45 MIN: CPT | Performed by: ORTHOPAEDIC SURGERY

## 2025-04-09 PROCEDURE — 73564 X-RAY EXAM KNEE 4 OR MORE: CPT | Mod: TC | Performed by: INTERNAL MEDICINE

## 2025-04-09 ASSESSMENT — KOOS JR
STRAIGHTENING KNEE FULLY: MODERATE
RISING FROM SITTING: EXTREME
HOW SEVERE IS YOUR KNEE STIFFNESS AFTER FIRST WAKING IN MORNING: SEVERE
KOOS JR SCORING: 24.88
GOING UP OR DOWN STAIRS: SEVERE
TWISING OR PIVOTING ON KNEE: EXTREME
BENDING TO THE FLOOR TO PICK UP OBJECT: EXTREME
STANDING UPRIGHT: EXTREME

## 2025-04-09 NOTE — LETTER
4/9/2025      Joyce Beck  [id number] [address] [place] [address]      Dear Colleague,    Thank you for referring your patient, Joyce Beck, to the [facility]. Please see a copy of my visit note below.    ORTHOPEDIC CONSULT      Chief Complaint: Joyce Beck is a 55 year old female who is being seen for   Chief Complaint   Patient presents with     Right Knee - Pain   Joyce Beck is a 55 year old female who is seen in consultation at the request of Dr Can for evaluation of right knee pain.      History of Present Illness:   Many year history of slowly progressive right greater than left knee pain.  Pain is mostly medial, but will be global at times. No radiating. Constant. Can be severe at times. Was recently seen by Dr. Can and underwent a ultrasound-guided right knee intra-articular corticosteroid injection on April 1, 2025.  She is also had a history of bilateral Synvisc 1 injections January 5, 2024. Has also used multiple types of bracing, rest, activity modification, weight loss and voltaren. Despite this the knees continue to get worse. 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 Also endorses the knees giving out, has had previous falls, and locking at times. She notes the pain is causing fatigue. Reports has very minimal ability to walk distances due to the pain.   No previous surgeries to the knees.       Patient's past medical, surgical, social and family histories reviewed.     Past Medical History:   Diagnosis Date     Unspecified hypothyroidism        Past Surgical History:   Procedure Laterality Date     COLONOSCOPY N/A 1/24/2025    Procedure: Colonoscopy with biopsy;  Surgeon: Randall Adorno MD;  Location: [facility]         Medications:  Current Outpatient Medications   Medication Sig Dispense Refill     diclofenac (VOLTAREN) 75 MG EC tablet Take 1 tablet (75 mg) by  "mouth 2 times daily as needed for moderate pain. 180 tablet 3     levothyroxine (SYNTHROID/LEVOTHROID) 88 MCG tablet Take 1 tablet (88 mcg) by mouth daily. 90 tablet 3     No current facility-administered medications for this visit.       Allergies   Allergen Reactions     Penicillins Swelling       Social History     Occupational History     Not on file   Tobacco Use     Smoking status: Never     Passive exposure: Never     Smokeless tobacco: Never   Vaping Use     Vaping status: Never Used   Substance and Sexual Activity     Alcohol use: No     Drug use: No     Sexual activity: Yes     Birth control/protection: Surgical       Family History   Problem Relation Age of Onset     Cancer No family hx of      Diabetes Paternal Grandfather      Diabetes Brother      Diabetes Sister      Heart Disease No family hx of      Heart Disease Maternal Grandfather         MI      Hypertension Paternal Grandfather      Cerebrovascular Disease No family hx of        REVIEW OF SYSTEMS  10 point review systems performed otherwise negative as noted as per history of present illness.    Physical Exam:  Vitals: Ht 1.633 m (5' 4.29\")   Wt 95.5 kg (210 lb 8 oz)   BMI 35.81 kg/m    BMI= Body mass index is 35.81 kg/m .  Constitutional: healthy, alert and no acute distress   Psychiatric: mentation appears normal and affect normal/bright  NEURO: no focal deficits  RESP: Normal with easy respirations and no use of accessory muscles to breathe, no audible wheezing or retractions  CV: No peripheral edema         Regular rate and rhythm by palpation  SKIN: No erythema, rashes, excoriation, or breakdown. No evidence of infection.   JOINT/EXTREMITIES:bilateral knees: No gross deformity although some varus alignment.  Tenderness along medial joint line.  Small effusion.  Pseudolaxity medial with valgus stressing.  Collateral ligaments are intact.  Active motion is 0-130 degrees motion.  Patellas track midline.  No pain at the hip with " range of motion.     GAIT: not tested     Diagnostic Modalities:  Right knee x-rays: Weightbearing views taken in the clinic today shows severe medial compartment bone-on-bone osteoarthritis.  Subchondral sclerotic changes as well as large osteophytes noted.  Patellofemoral shows some moderate lateral facet narrowing with some rimming osteophytes.  Lateral radiograph shows some suprapatellar calcifications.    MR right knee without contrast 3/31/2025 9:33 AM     Techniques: Multiplanar multisequence imaging of the right knee was  obtained without administration of intra-articular or intravenous  contrast using routing protocol.     History: Primary OA with severe medial sided knee pain, mechanical  clicking/locking, instability.; Primary osteoarthritis of right knee     Comparison: Bilateral knee x-ray 3/27/2024     Findings:     MENISCI:  Medial meniscus: Radial meniscal tear at the posterior horn.  Lateral meniscus: Intact.     LIGAMENTS  Cruciate ligaments: ACL demonstrates increased signal, grossly intact.  Medial supporting structures: Intact.  Lateral supporting structures: Intact.     EXTENSOR MECHANISM  Intact.     FLUID  Small joint effusion. No substantial Baker's cyst.     OSSEOUS and ARTICULAR STRUCTURES  Bones: Extensive osteophyte formation including the trochlear,  patellofemoral surfaces. No fracture, contusion, or osseous lesion is  seen.     Patellofemoral compartment: Scattered cartilage loss. Joint body in  the suprapatellar recess, new compared to prior x-ray 2024.  Medial compartment: Diffuse full-thickness cartilage loss at the  femorotibial surface.     Lateral compartment: Severe fissuring with cartilage loss of anterior  trochlear surface.     ANCILLARY FINDINGS  None.                                                                      Impression:  1. Marked osteoarthrosis with scattered areas of high-grade cartilage  loss, most severe at the medial femorotibial surface and  slightly  lesser extent in the patellofemoral joint compartment.     2. Degenerative tearing of the posterior horn of the medial meniscus  with a radial component and peripheral extrusion.     3. ACL, posterior cruciate ligament, medial and lateral supporting  structures and lateral meniscus are grossly intact.    Independent visualization of the images was performed.      Impression: right knee primary osteoarthritis    Plan:  All of the above pertinent physical exam and imaging modalities findings was reviewed with Joyce.                                           ELECTIVE SURGERY:  The patient has attempted conservative treatments that include ultrasound-guided right knee intra-articular corticosteroid injection, bilateral Synvisc 1 injections. Has also used multiple types of bracing, rest, activity modification, weight loss and voltaren. Despite this the knees continue to get worse. 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 Also endorses the knees giving out, has had previous falls, and locking at times. She notes the pain is causing fatigue. Reports has very minimal ability to walk distances due to the pain.  Secondary to these reasons I have offered surgery in the form of Right ariana assisted total knee replacement.     Risks, benefits, complications, alternatives, limitations, and post operative course were discussed. Risks including infection (requiring long-term antibiotics and repeat surgeries), implant loosening (requiring revision surgery), heart attacks, strokes, bleeding (possibly requiring blood transfusion), scars, instability, numbness around the knee, stiffness (requiring manipulations or repeat surgeries), instability and dislocations, fractures, blood clots the legs and lungs, nerve injury (possibly leading to foot drop).  Postoperative course was discussed as far as limitations and expected recovery times. It was also discussed that  after surgery there is a need for blood thinners to prevent blood clots, but even when being treated it is possible to develop a blood clot.  Anticipate Fast Track discharge.  We discussed physical therapy being an important component of postop success.  Past medical and surgical history was reviewed.  Ms. Beck will need a pre-operative medical evaluation and clearance by a primary care provider. We will obtain a CBC and the appropriate radiographs prior to surgery. I will leave it to the discretion of the primary care provider for additional pre-operative testing.   Due to intraarticular injection surgery will be anytime after July 1st.     Understands need for CT  Physical therapy through [place] [facility] (her first choice, if insurance does not cover [facility])  ASA for Dvt prevention  [facility] - will have someone to stay with her.         Return to clinic 1 week prior to surgery, 14 days postop, or sooner as needed for changes.  Re-x-ray on return: Yes. Postop visit    Hiram Montoya D.O.      Again, thank you for allowing me to participate in the care of your patient.        Sincerely,        Moises Montoya, DO    Electronically signed

## 2025-04-10 ENCOUNTER — TELEPHONE (OUTPATIENT)
Dept: ORTHOPEDICS | Facility: CLINIC | Age: 55
End: 2025-04-10
Payer: COMMERCIAL

## 2025-04-14 NOTE — TELEPHONE ENCOUNTER
Type of surgery: Right ariana assisted total knee replacement (Right)   Location of surgery: [facility]  Date and time of surgery: 7/8/25  Surgeon: Lindsay  Pre-Op Appt Date: 6/17  Post-Op Appt Date: 7/21   Packet sent out: Yes carmen ok per pt   Pre-cert/Authorization completed:  Not Applicable  Date: na  Pt will wait for CT to call, also gave # in [employer] message.

## 2025-05-01 ENCOUNTER — TELEPHONE (OUTPATIENT)
Dept: ORTHOPEDICS | Facility: CLINIC | Age: 55
End: 2025-05-01
Payer: COMMERCIAL

## 2025-05-01 NOTE — TELEPHONE ENCOUNTER
"Forms started by writer and given to Ronnie Mcmanus CNP for review. Forms returned to [facility] completed.        Form was faxed to the designated fax number: [phone]  Confirmed sent via [employer] at 8:54am, 05/01/25        A copy was sent to [facility].     A copy was placed in [facility]'s \"faxed\" file/drawer.       Claudia Hackett [facility]    "

## 2025-05-01 NOTE — TELEPHONE ENCOUNTER
Reason for Call:  Form, our goal is to have forms completed with 72 hours, however, some forms may require a visit or additional information.    Type of letter, form or note:  FMLA    Who is the form from?: Patient/[employer]    Where did the form come from: form was faxed in    What clinic location was the form placed at?: [facility]    Where the form was placed: Given to [facility]    What number is listed as a contact on the form?: None       Additional comments: Fax number [phone]    Claudia Hackett ATC

## 2025-05-02 PROBLEM — N92.4 EXCESSIVE BLEEDING IN PREMENOPAUSAL PERIOD: Status: ACTIVE | Noted: 2023-10-26

## 2025-05-02 PROBLEM — D50.9 IRON DEFICIENCY ANEMIA: Status: ACTIVE | Noted: 2023-07-20

## 2025-05-02 PROBLEM — R73.03 PREDIABETES: Status: ACTIVE | Noted: 2021-01-22

## 2025-05-06 ENCOUNTER — HOSPITAL ENCOUNTER (OUTPATIENT)
Dept: CT IMAGING | Facility: CLINIC | Age: 55
Discharge: HOME | End: 2025-05-06
Attending: NURSE PRACTITIONER | Admitting: NURSE PRACTITIONER
Payer: COMMERCIAL

## 2025-05-06 DIAGNOSIS — M17.11 PRIMARY OSTEOARTHRITIS OF RIGHT KNEE: ICD-10-CM

## 2025-05-06 PROCEDURE — 73700 CT LOWER EXTREMITY W/O DYE: CPT | Mod: RT

## 2025-06-02 ENCOUNTER — TELEPHONE (OUTPATIENT)
Dept: ORTHOPEDICS | Facility: CLINIC | Age: 55
End: 2025-06-02
Payer: COMMERCIAL

## 2025-06-02 NOTE — TELEPHONE ENCOUNTER
Wouldn't recommend during surgery.   However, When we see her on 7/2/25 for her 1 week prior to surgery visit could do the injection then or she could return at any time between now and surgery for the left knee steroid injection.        PERRY Diaz, CNP  Orthopedic Surgery

## 2025-06-02 NOTE — TELEPHONE ENCOUNTER
Reason for Call:  Other Knee injection    Detailed comments: Joyce is scheduled for right knee replacement on 7/8/25, she wants Dr Montoya to do a cortisone injection on her left knee while in the operating room. Please let her know if this is possible.    Phone Number Patient can be reached at: Cell number on file:    Telephone Information:   Mobile [phone]       Best Time:     Can we leave a detailed message on this number? YES    Call taken on 6/2/2025 at 9:29 AM by Camille Eastman

## 2025-06-02 NOTE — TELEPHONE ENCOUNTER
I spoke to Joyce, she will do the injection at the time of her appointment on 7/2/25 - I added knee injection in the appointment notes, does the appointment visit type need to be changed or the time lengthened?

## 2025-06-06 ENCOUNTER — TELEPHONE (OUTPATIENT)
Dept: ORTHOPEDICS | Facility: CLINIC | Age: 55
End: 2025-06-06
Payer: COMMERCIAL

## 2025-06-06 NOTE — TELEPHONE ENCOUNTER
Reason for Call:  Other call back    Detailed comments: Joyce is signed up to do the online joint replacement class early afternoon on Monday.     She would appreciate if someone can call her  Monday morning to help her know how to access the class. Thank you!     Phone Number Patient can be reached at: Cell number on file:    Telephone Information:   Mobile [phone]       Best Time: any    Can we leave a detailed message on this number? YES    Call taken on 6/6/2025 at 3:34 PM by Johnny Espino

## 2025-06-09 NOTE — TELEPHONE ENCOUNTER
Joyce is calling because she just did the surgery class.  She said Dr Montoya said as long as she's doing ok, she can go home the same day.    The gal doing the class said repeatedly throughout that you do need to plan on spending the night and going home the next day.    Can someone please call her and clarify??  She does understand that if things are not going ok, that she would need to stay overnight, but the gal was basically saying that no matter what, you stay overnight.    Albina Seymour

## 2025-06-09 NOTE — TELEPHONE ENCOUNTER
LM for patient to call nurse back.     Moni Bass RN   [facility]

## 2025-06-10 NOTE — TELEPHONE ENCOUNTER
Nurse spoke with patient and relayed information that it's possible to go home the same day as surgery. Her chart will be reviewed and she will need to be cleared at her pre-op and it will depend on the time her surgery is at. Patient verbalized understanding.     Moni Bass RN   [facility]

## 2025-06-10 NOTE — TELEPHONE ENCOUNTER
LM for patient to call clinic back and ask for Moni.     Moni Bass, RN   [facility]

## 2025-06-16 ENCOUNTER — TELEPHONE (OUTPATIENT)
Dept: FAMILY MEDICINE | Facility: OTHER | Age: 55
End: 2025-06-16
Payer: COMMERCIAL

## 2025-06-22 ENCOUNTER — MYC MEDICAL ADVICE (OUTPATIENT)
Dept: ORTHOPEDICS | Facility: CLINIC | Age: 55
End: 2025-06-22
Payer: COMMERCIAL

## 2025-06-28 NOTE — PROGRESS NOTES
1 week prior to right knee replacement    H&P and labs reviewed: completed yesterday. Optimized. No red flags.     CT completed on 5/6/25    Plan for Physical therapy: scheduled to start 7/11/25 with [facility]    Plan for discharge: Day 0 discharge. Has family at home to help.     Medications/Allergies: pencillin caused swelling and seizures. She reports has had facial swelling when exposed to penicillin that has been added to foods.     Given the severity of the reaction will plan for clindamycin.     Medication for DVT prophylaxis: ASA     At the last visit we discussed a left knee steroid injection today. She elected to hold off on this at this time.      PERRY Diaz, CNP  Orthopedic Surgery

## 2025-06-30 NOTE — PROGRESS NOTES
Ortho Navigator Note      Pre-op Date 7/1/25     Medical Clearance  CLEARED     Labs WNL     COVID Test Date No longer indicated      Skin  Intact      Activity: Ambulates independently     Equipment Need Patient will likely need a walker for discharge. Defer to PT/OT for recs.       Meds to Hold Held all supplements 14 days prior to surgery  Additional Medication Instructions  Take all scheduled medications on the day of surgery EXCEPT for modifications listed below:   - Herbal medications and vitamins: DO NOT TAKE 14 days prior to surgery.   - diclofenac (Voltaren): DO NOT TAKE for 3 days for high bleeding risk or PRN use.   OK to take synthroid with a small sip of water AM of procedure        * Medication recommendations are not intended to be exhaustive; they are limited to common medications that are potentially dangerous if incorrectly managed   NPO Instructions  Instructed to stop solids 8 hr prior to arrival and clears 2 hr prior to arrival.       Pre-op Joint Education Complete? Stated she completed the zoom class    Discharge Plan Patient has plan to discharge home on morning of POD 0.   Jeff Bill will arrive at hospital to participate in therapy and discharge education. They will then transport patient home     *Daughter Landy will be staying with patient for 1 week after surgery   /Transportation Daughter Landy will be  and transportation.  is physically able to care for patient.      Barriers to Discharge No barriers to discharge.      Additional Info/   Special Needs : Patient had no unanswered questions or concerns.        Post Op PT 7/11/25 06/30/25 1391   Discharge Planning   Patient/Family Anticipates Transition to home   Concerns to be Addressed denies needs/concerns at this time   Living Arrangements   People in Home alone   Type of Residence Private Residence   Is your private residence a single family home or apartment? Single family home  (split level home)    Number of Stairs, Within Home, Primary eight   Stair Railings, Within Home, Primary railings safe and in good condition   Once home, are you able to live on one level? Yes   Which level? Upper Level   Bathroom Shower/Tub Tub/Shower unit   Equipment Currently Used at Home shower chair  (Bed rails, bars connected to the toilet)   Support System   Support Systems Family Members   Do you have someone available to stay with you one or two nights once you are home? Yes   Medical Clearance   Date of Physical 07/01/25   Clinic Name [facility]   Blood   Known Bleeding Disorder or Coagulopathy No   Does the patient have any Jewish/cultural preferences related to blood products? No   Education   Has the patient scheduled or completed pre-op total joint education, either in class or online, in the last 12 months? Yes   Patient attended total joint pre-op class/received pre-op teaching  online   Falls Risk   Has the patient been identified as a high falls risk before surgery? (fallen in the last 6 months, history of falls, unsteady gait, or balance issues) Yes   Did an order get placed to attend outpatient pre-surgery balance evaluation? No

## 2025-07-01 ENCOUNTER — OFFICE VISIT (OUTPATIENT)
Dept: FAMILY MEDICINE | Facility: OTHER | Age: 55
End: 2025-07-01
Payer: COMMERCIAL

## 2025-07-01 VITALS
HEIGHT: 63 IN | DIASTOLIC BLOOD PRESSURE: 70 MMHG | RESPIRATION RATE: 18 BRPM | BODY MASS INDEX: 35.53 KG/M2 | SYSTOLIC BLOOD PRESSURE: 108 MMHG | HEART RATE: 81 BPM | OXYGEN SATURATION: 95 % | WEIGHT: 200.5 LBS | TEMPERATURE: 97.6 F

## 2025-07-01 DIAGNOSIS — M17.0 PRIMARY OSTEOARTHRITIS OF BOTH KNEES: ICD-10-CM

## 2025-07-01 DIAGNOSIS — Z01.818 PREOP GENERAL PHYSICAL EXAM: Primary | ICD-10-CM

## 2025-07-01 DIAGNOSIS — E03.9 HYPOTHYROIDISM, UNSPECIFIED TYPE: ICD-10-CM

## 2025-07-01 LAB
ERYTHROCYTE [DISTWIDTH] IN BLOOD BY AUTOMATED COUNT: 13.6 % (ref 10–15)
EST. AVERAGE GLUCOSE BLD GHB EST-MCNC: 105 MG/DL
HBA1C MFR BLD: 5.3 % (ref 0–5.6)
HCT VFR BLD AUTO: 44.2 % (ref 35–47)
HGB BLD-MCNC: 14.6 G/DL (ref 11.7–15.7)
MCH RBC QN AUTO: 29.8 PG (ref 26.5–33)
MCHC RBC AUTO-ENTMCNC: 33 G/DL (ref 31.5–36.5)
MCV RBC AUTO: 90 FL (ref 78–100)
PLATELET # BLD AUTO: 260 10E3/UL (ref 150–450)
RBC # BLD AUTO: 4.9 10E6/UL (ref 3.8–5.2)
TSH SERPL DL<=0.005 MIU/L-ACNC: 4.13 UIU/ML (ref 0.3–4.2)
WBC # BLD AUTO: 7.6 10E3/UL (ref 4–11)

## 2025-07-01 PROCEDURE — 1126F AMNT PAIN NOTED NONE PRSNT: CPT | Performed by: PHYSICIAN ASSISTANT

## 2025-07-01 PROCEDURE — 83036 HEMOGLOBIN GLYCOSYLATED A1C: CPT | Performed by: PHYSICIAN ASSISTANT

## 2025-07-01 PROCEDURE — 93000 ELECTROCARDIOGRAM COMPLETE: CPT | Performed by: PHYSICIAN ASSISTANT

## 2025-07-01 PROCEDURE — 85027 COMPLETE CBC AUTOMATED: CPT | Performed by: PHYSICIAN ASSISTANT

## 2025-07-01 PROCEDURE — 3078F DIAST BP <80 MM HG: CPT | Performed by: PHYSICIAN ASSISTANT

## 2025-07-01 PROCEDURE — 99214 OFFICE O/P EST MOD 30 MIN: CPT | Performed by: PHYSICIAN ASSISTANT

## 2025-07-01 PROCEDURE — 3074F SYST BP LT 130 MM HG: CPT | Performed by: PHYSICIAN ASSISTANT

## 2025-07-01 PROCEDURE — 36415 COLL VENOUS BLD VENIPUNCTURE: CPT | Performed by: PHYSICIAN ASSISTANT

## 2025-07-01 PROCEDURE — 84443 ASSAY THYROID STIM HORMONE: CPT | Performed by: PHYSICIAN ASSISTANT

## 2025-07-01 ASSESSMENT — KOOS JR
HOW SEVERE IS YOUR KNEE STIFFNESS AFTER FIRST WAKING IN MORNING: SEVERE
TWISING OR PIVOTING ON KNEE: EXTREME
KOOS JR SCORING: 24.88
BENDING TO THE FLOOR TO PICK UP OBJECT: SEVERE
STANDING UPRIGHT: EXTREME
RISING FROM SITTING: EXTREME
GOING UP OR DOWN STAIRS: SEVERE
STRAIGHTENING KNEE FULLY: SEVERE

## 2025-07-01 ASSESSMENT — PAIN SCALES - GENERAL: PAINLEVEL_OUTOF10: NO PAIN (0)

## 2025-07-01 NOTE — PROGRESS NOTES
Preoperative Evaluation  [address]  [facility] [address]  Phone: [phone]  Fax: [phone]  Primary Provider: Veronique Olson PA-C  Pre-op Performing Provider: Veronique Olson PA-C  Jul 1, 2025 6/28/2025   Surgical Information   What procedure is being done? Rt Knee Replacement   Facility or Hospital where procedure/surgery will be performed: [facility]   Who is doing the procedure / surgery? Dr. Montoya   Date of surgery / procedure: 07/08/25   Time of surgery / procedure: Tbd   Where do you plan to recover after surgery? at home with family     Fax number for surgical facility: Note does not need to be faxed, will be available electronically in Epic.    Assessment & Plan     The proposed surgical procedure is considered INTERMEDIATE risk.      ICD-10-CM    1. Preop general physical exam  Z01.818 REVIEW OF HEALTH MAINTENANCE PROTOCOL ORDERS     TSH with free T4 reflex     CBC with platelets     EKG 12-lead complete w/read - Clinics     Hemoglobin A1c      2. Primary osteoarthritis of both knees  M17.0       3. Hypothyroidism, unspecified type  E03.9              - No identified additional risk factors other than previously addressed    Antiplatelet or Anticoagulation Medication Instructions   - We reviewed the medication list and the patient is not on an antiplatelet or anticoagulation medications.    Additional Medication Instructions  Take all scheduled medications on the day of surgery EXCEPT for modifications listed below:   - Herbal medications and vitamins: DO NOT TAKE 14 days prior to surgery.   - diclofenac (Voltaren): DO NOT TAKE for 3 days for high bleeding risk or PRN use.    Recommendation  Approval given to proceed with proposed procedure pending review of diagnostic evaluation.    - Diclofenac hold for 3 days         The patient indicates understanding of these issues and agrees with the  plan.    Follow up:    Isai Olson PA-C  [facility] - Lazaro Cook is a 55 year old, presenting for the following:  Pre-Op Exam          7/1/2025     3:12 PM   Additional Questions   Roomed by Becki   Accompanied by Self         7/1/2025     3:12 PM   Patient Reported Additional Medications   Patient reports taking the following new medications NA     HPI: Injections for years, no longer work, severe oa on exam, surgery recommended due to continued pain         6/28/2025   Pre-Op Questionnaire   Have you ever had a heart attack or stroke? No   Have you ever had surgery on your heart or blood vessels, such as a stent placement, a coronary artery bypass, or surgery on an artery in your head, neck, heart, or legs? No   Do you have chest pain with activity? No   Do you have a history of heart failure? No   Do you currently have a cold, bronchitis or symptoms of other infection? No   Do you have a cough, shortness of breath, or wheezing? No   Do you or anyone in your family have previous history of blood clots? No   Do you or does anyone in your family have a serious bleeding problem such as prolonged bleeding following surgeries or cuts? No   Have you ever had problems with anemia or been told to take iron pills? No, distant past    Have you had any abnormal blood loss such as black, tarry or bloody stools, or abnormal vaginal bleeding? No   Have you ever had a blood transfusion? No   Are you willing to have a blood transfusion if it is medically needed before, during, or after your surgery? Yes   Have you or any of your relatives ever had problems with anesthesia? No   Do you have sleep apnea, excessive snoring or daytime drowsiness? No   Do you have any artifical heart valves or other implanted medical devices like a pacemaker, defibrillator, or continuous glucose monitor? No   Do you have artificial joints? No   Are you allergic to latex? No     Advance Care  Planning  Discussed advance care planning with patient; however, patient declined at this time.    Preoperative Review of    reviewed - controlled substances reflected in medication list.      Status of Chronic Conditions:  See problem list for active medical problems.  Problems all longstanding and stable, except as noted/documented.  See ROS for pertinent symptoms related to these conditions.    HYPOTHYROIDISM - Patient has a longstanding history of chronic Hypothyroidism. Patient has been doing well, noting no tremor, insomnia, hair loss or changes in skin texture. Continues to take medications as directed, without adverse reactions or side effects. Last TSH   Lab Results   Component Value Date    TSH 7.66 (H) 02/05/2025   .      Patient Active Problem List    Diagnosis Date Noted    Excessive bleeding in premenopausal period 10/26/2023     Priority: Medium    Iron deficiency anemia 07/20/2023     Priority: Medium    Prediabetes 01/22/2021     Priority: Medium    Hypothyroidism      Priority: Medium     Problem list name updated by automated process. Provider to review        Past Medical History:   Diagnosis Date    Unspecified hypothyroidism      Past Surgical History:   Procedure Laterality Date    COLONOSCOPY N/A 1/24/2025    Procedure: Colonoscopy with biopsy;  Surgeon: Randall Adorno MD;  Location: [facility] LIGATE FALLOPIAN TUBE       Current Outpatient Medications   Medication Sig Dispense Refill    diclofenac (VOLTAREN) 75 MG EC tablet Take 1 tablet (75 mg) by mouth 2 times daily as needed for moderate pain. 180 tablet 3    levothyroxine (SYNTHROID/LEVOTHROID) 88 MCG tablet Take 1 tablet (88 mcg) by mouth daily. 90 tablet 3    ketoconazole (NIZORAL) 2 % external cream Apply topically daily. 60 g 1       Allergies   Allergen Reactions    Penicillins Other (See Comments) and Swelling     Seizure         Social History     Tobacco Use    Smoking status: Never     Passive exposure: Never     "Smokeless tobacco: Never   Substance Use Topics    Alcohol use: No     Family History   Problem Relation Age of Onset    Diabetes Paternal Grandfather     Hypertension Paternal Grandfather     Diabetes Brother     Diabetes Sister     Heart Disease Maternal Grandfather         MI     Cancer No family hx of     Cerebrovascular Disease No family hx of      History   Drug Use No             Review of Systems  Constitutional, neuro, ENT, endocrine, pulmonary, cardiac, gastrointestinal, genitourinary, musculoskeletal, integument and psychiatric systems are negative, except as otherwise noted.    Objective    /70   Pulse 81   Temp 97.6  F (36.4  C) (Temporal)   Resp 18   Ht 1.605 m (5' 3.19\")   Wt 90.9 kg (200 lb 8 oz)   LMP  (Exact Date)   SpO2 95%   BMI 35.30 kg/m     Estimated body mass index is 35.3 kg/m  as calculated from the following:    Height as of this encounter: 1.605 m (5' 3.19\").    Weight as of this encounter: 90.9 kg (200 lb 8 oz).  Physical Exam  GENERAL: alert and no distress  EYES: Eyes grossly normal to inspection, PERRL and conjunctivae and sclerae normal  HENT: ear canals and TM's normal, nose and mouth without ulcers or lesions  NECK: no adenopathy, no asymmetry, masses, or scars  RESP: lungs clear to auscultation - no rales, rhonchi or wheezes  CV: regular rate and rhythm, normal S1 S2, no S3 or S4, no murmur, click or rub, no peripheral edema  ABDOMEN: soft, nontender, no hepatosplenomegaly, no masses and bowel sounds normal  MS: no gross musculoskeletal defects noted, no edema  SKIN: no suspicious lesions or rashes  NEURO: Normal strength and tone, mentation intact and speech normal  PSYCH: mentation appears normal, affect normal/bright    Recent Labs   Lab Test 24  1614      POTASSIUM 3.8   CR 0.85        Diagnostics  Labs pending at this time.  Results will be reviewed when available.   EKG: appears normal, NSR, normal axis, normal intervals, no acute ST/T " changes c/w ischemia, no LVH by voltage criteria, there are no prior tracings available    Revised Cardiac Risk Index (RCRI)  The patient has the following serious cardiovascular risks for perioperative complications:   - No serious cardiac risks = 0 points     RCRI Interpretation: 0 points: Class I (very low risk - 0.4% complication rate)         Signed Electronically by: Veronique Olson PA-C  A copy of this evaluation report is provided to the requesting physician.       01-Dec-2021 18:18

## 2025-07-01 NOTE — PATIENT INSTRUCTIONS
How to Take Your Medication Before Surgery  Preoperative Medication Instructions   Antiplatelet or Anticoagulation Medication Instructions   - We reviewed the medication list and the patient is not on an antiplatelet or anticoagulation medications.    Additional Medication Instructions  Take all scheduled medications on the day of surgery EXCEPT for modifications listed below:   - Herbal medications and vitamins: DO NOT TAKE 14 days prior to surgery.   - diclofenac (Voltaren): DO NOT TAKE for 3 days for high bleeding risk or PRN use.   OK to take synthroid with a small sip of water AM of procedure           Patient Education   Preparing for Your Surgery  For Adults  Getting started  In most cases, a nurse will call to review your health history and instructions. They will give you an arrival time based on your scheduled surgery time. Please be ready to share:  Your doctor's clinic name and phone number  Your medical, surgical, and anesthesia history  A list of allergies and sensitivities  A list of medicines, including herbal treatments and over-the-counter drugs  Whether the patient has a legal guardian (ask how to send us the papers in advance)  Note: You may not receive a call if you were seen at our PAC (Preoperative Assessment Center).  Please tell us if you're pregnant--or if there's any chance you might be pregnant. Some surgeries may injure a fetus (unborn baby), so they require a pregnancy test. Surgeries that are safe for a fetus don't always need a test, and you can choose whether to have one.   Preparing for surgery  Within 10 to 30 days of surgery: Have a pre-op exam (sometimes called an H&P, or History and Physical). This can be done at a clinic or pre-operative center.  If you're having a , you may not need this exam. Talk to your care team.  At your pre-op exam, talk to your care team about all medicines you take. (This includes CBD oil and any drugs, such as THC, marijuana, and other forms  of cannabis.) If you need to stop any medicine before surgery, ask when to start taking it again.  This is for your safety. Many medicines and drugs can make you bleed too much during surgery. Some change how well surgery (anesthesia) drugs work.  Call your insurance company to let them know you're having surgery. (If you don't have insurance, call [phone].)  Call your clinic if there's any change in your health. This includes a scrape or scratch near the surgery site, or any signs of a cold (sore throat, runny nose, cough, rash, fever).  Eating and drinking guidelines  For your safety: Unless your surgeon tells you otherwise, follow the guidelines below.  Eat and drink as normal until 8 hours before you arrive for surgery. After that, no food or milk. You can spit out gum when you arrive.  Drink clear liquids until 2 hours before you arrive. These are liquids you can see through, like water, Gatorade, and Propel Water. They also include plain black coffee and tea (no cream or milk).  No alcohol for 24 hours before you arrive. The night before surgery, stop any drinks that contain THC.  If your care team tells you to take medicine on the morning of surgery, it's okay to take it with a sip of water. No other medicines or drugs are allowed (including CBD oil)--follow your care team's instructions.  If you have questions the day of surgery, call your hospital or surgery center.   Preventing infection  Shower or bathe the night before and the morning of surgery. Follow the instructions your clinic gave you. (If no instructions, use regular soap.)  Don't shave or clip hair near your surgery site. We'll remove the hair if needed.  Don't smoke or vape the morning of surgery. No chewing tobacco for 6 hours before you arrive. A nicotine patch is okay. You may spit out nicotine gum when you arrive.  For some surgeries, the surgeon will tell you to fully quit smoking and nicotine.  We will make every effort to keep you  safe from infection. We will:  Clean our hands often with soap and water (or an alcohol-based hand rub).  Clean the skin at your surgery site with a special soap that kills germs.  Give you a special gown to keep you warm. (Cold raises the risk of infection.)  Wear hair covers, masks, gowns, and gloves during surgery.  Give antibiotic medicine, if prescribed. Not all surgeries need this medicine.  What to bring on the day of surgery  Photo ID and insurance card  Copy of your health care directive, if you have one  Glasses and hearing aids (bring cases)  You can't wear contacts during surgery  Inhaler and eye drops, if you use them (tell us about these when you arrive)  CPAP machine or breathing device, if you use them  A few personal items, if spending the night  If you have . . .  A pacemaker, ICD (cardiac defibrillator), or other implant: Bring the ID card.  An implanted stimulator: Bring the remote control.  A legal guardian: Bring a copy of the certified (court-stamped) guardianship papers.  Please remove any jewelry, including body piercings. Leave jewelry and other valuables at home.  If you're going home the day of surgery  You must have a support person drive you home. They should stay with you overnight, and they may need to help with your self-care.  If you don't have a support person, please tells us as soon as possible. We can help.  After surgery  If it's hard to control your pain or you need more pain medicine, please call your surgeon's office.  Questions?   If you have any questions for your care team, list them here:   ____________________________________________________________________________________________________________________________________________________________________________________________________________________________________________________________  For informational purposes only. Not to replace the advice of your health care provider. Copyright   2003, 2019 [facility]. All rights reserved. Clinically reviewed by Elvin Cm MD. Vaughn Burton [id number] - REV 02/25.

## 2025-07-01 NOTE — H&P (VIEW-ONLY)
Preoperative Evaluation  [address]  [facility] [address]  Phone: [phone]  Fax: [phone]  Primary Provider: Veronique Olson PA-C  Pre-op Performing Provider: Veroniuqe Olson PA-C  Jul 1, 2025 6/28/2025   Surgical Information   What procedure is being done? Rt Knee Replacement   Facility or Hospital where procedure/surgery will be performed: [facility]   Who is doing the procedure / surgery? Dr. Montoya   Date of surgery / procedure: 07/08/25   Time of surgery / procedure: Tbd   Where do you plan to recover after surgery? at home with family     Fax number for surgical facility: Note does not need to be faxed, will be available electronically in Epic.    Assessment & Plan     The proposed surgical procedure is considered INTERMEDIATE risk.      ICD-10-CM    1. Preop general physical exam  Z01.818 REVIEW OF HEALTH MAINTENANCE PROTOCOL ORDERS     TSH with free T4 reflex     CBC with platelets     EKG 12-lead complete w/read - Clinics     Hemoglobin A1c      2. Primary osteoarthritis of both knees  M17.0       3. Hypothyroidism, unspecified type  E03.9              - No identified additional risk factors other than previously addressed    Antiplatelet or Anticoagulation Medication Instructions   - We reviewed the medication list and the patient is not on an antiplatelet or anticoagulation medications.    Additional Medication Instructions  Take all scheduled medications on the day of surgery EXCEPT for modifications listed below:   - Herbal medications and vitamins: DO NOT TAKE 14 days prior to surgery.   - diclofenac (Voltaren): DO NOT TAKE for 3 days for high bleeding risk or PRN use.    Recommendation  Approval given to proceed with proposed procedure pending review of diagnostic evaluation.    - Diclofenac hold for 3 days         The patient indicates understanding of these issues and agrees with the  plan.    Follow up:    Isai Olson PA-C  [facility] - Lazaro Cook is a 55 year old, presenting for the following:  Pre-Op Exam          7/1/2025     3:12 PM   Additional Questions   Roomed by Becki   Accompanied by Self         7/1/2025     3:12 PM   Patient Reported Additional Medications   Patient reports taking the following new medications NA     HPI: Injections for years, no longer work, severe oa on exam, surgery recommended due to continued pain         6/28/2025   Pre-Op Questionnaire   Have you ever had a heart attack or stroke? No   Have you ever had surgery on your heart or blood vessels, such as a stent placement, a coronary artery bypass, or surgery on an artery in your head, neck, heart, or legs? No   Do you have chest pain with activity? No   Do you have a history of heart failure? No   Do you currently have a cold, bronchitis or symptoms of other infection? No   Do you have a cough, shortness of breath, or wheezing? No   Do you or anyone in your family have previous history of blood clots? No   Do you or does anyone in your family have a serious bleeding problem such as prolonged bleeding following surgeries or cuts? No   Have you ever had problems with anemia or been told to take iron pills? No, distant past    Have you had any abnormal blood loss such as black, tarry or bloody stools, or abnormal vaginal bleeding? No   Have you ever had a blood transfusion? No   Are you willing to have a blood transfusion if it is medically needed before, during, or after your surgery? Yes   Have you or any of your relatives ever had problems with anesthesia? No   Do you have sleep apnea, excessive snoring or daytime drowsiness? No   Do you have any artifical heart valves or other implanted medical devices like a pacemaker, defibrillator, or continuous glucose monitor? No   Do you have artificial joints? No   Are you allergic to latex? No     Advance Care  Planning  Discussed advance care planning with patient; however, patient declined at this time.    Preoperative Review of    reviewed - controlled substances reflected in medication list.      Status of Chronic Conditions:  See problem list for active medical problems.  Problems all longstanding and stable, except as noted/documented.  See ROS for pertinent symptoms related to these conditions.    HYPOTHYROIDISM - Patient has a longstanding history of chronic Hypothyroidism. Patient has been doing well, noting no tremor, insomnia, hair loss or changes in skin texture. Continues to take medications as directed, without adverse reactions or side effects. Last TSH   Lab Results   Component Value Date    TSH 7.66 (H) 02/05/2025   .      Patient Active Problem List    Diagnosis Date Noted    Excessive bleeding in premenopausal period 10/26/2023     Priority: Medium    Iron deficiency anemia 07/20/2023     Priority: Medium    Prediabetes 01/22/2021     Priority: Medium    Hypothyroidism      Priority: Medium     Problem list name updated by automated process. Provider to review        Past Medical History:   Diagnosis Date    Unspecified hypothyroidism      Past Surgical History:   Procedure Laterality Date    COLONOSCOPY N/A 1/24/2025    Procedure: Colonoscopy with biopsy;  Surgeon: Randall Adorno MD;  Location: [facility] LIGATE FALLOPIAN TUBE       Current Outpatient Medications   Medication Sig Dispense Refill    diclofenac (VOLTAREN) 75 MG EC tablet Take 1 tablet (75 mg) by mouth 2 times daily as needed for moderate pain. 180 tablet 3    levothyroxine (SYNTHROID/LEVOTHROID) 88 MCG tablet Take 1 tablet (88 mcg) by mouth daily. 90 tablet 3    ketoconazole (NIZORAL) 2 % external cream Apply topically daily. 60 g 1       Allergies   Allergen Reactions    Penicillins Other (See Comments) and Swelling     Seizure         Social History     Tobacco Use    Smoking status: Never     Passive exposure: Never     "Smokeless tobacco: Never   Substance Use Topics    Alcohol use: No     Family History   Problem Relation Age of Onset    Diabetes Paternal Grandfather     Hypertension Paternal Grandfather     Diabetes Brother     Diabetes Sister     Heart Disease Maternal Grandfather         MI     Cancer No family hx of     Cerebrovascular Disease No family hx of      History   Drug Use No             Review of Systems  Constitutional, neuro, ENT, endocrine, pulmonary, cardiac, gastrointestinal, genitourinary, musculoskeletal, integument and psychiatric systems are negative, except as otherwise noted.    Objective    /70   Pulse 81   Temp 97.6  F (36.4  C) (Temporal)   Resp 18   Ht 1.605 m (5' 3.19\")   Wt 90.9 kg (200 lb 8 oz)   LMP  (Exact Date)   SpO2 95%   BMI 35.30 kg/m     Estimated body mass index is 35.3 kg/m  as calculated from the following:    Height as of this encounter: 1.605 m (5' 3.19\").    Weight as of this encounter: 90.9 kg (200 lb 8 oz).  Physical Exam  GENERAL: alert and no distress  EYES: Eyes grossly normal to inspection, PERRL and conjunctivae and sclerae normal  HENT: ear canals and TM's normal, nose and mouth without ulcers or lesions  NECK: no adenopathy, no asymmetry, masses, or scars  RESP: lungs clear to auscultation - no rales, rhonchi or wheezes  CV: regular rate and rhythm, normal S1 S2, no S3 or S4, no murmur, click or rub, no peripheral edema  ABDOMEN: soft, nontender, no hepatosplenomegaly, no masses and bowel sounds normal  MS: no gross musculoskeletal defects noted, no edema  SKIN: no suspicious lesions or rashes  NEURO: Normal strength and tone, mentation intact and speech normal  PSYCH: mentation appears normal, affect normal/bright    Recent Labs   Lab Test 24  1614      POTASSIUM 3.8   CR 0.85        Diagnostics  Labs pending at this time.  Results will be reviewed when available.   EKG: appears normal, NSR, normal axis, normal intervals, no acute ST/T " changes c/w ischemia, no LVH by voltage criteria, there are no prior tracings available    Revised Cardiac Risk Index (RCRI)  The patient has the following serious cardiovascular risks for perioperative complications:   - No serious cardiac risks = 0 points     RCRI Interpretation: 0 points: Class I (very low risk - 0.4% complication rate)         Signed Electronically by: Veronique Olson PA-C  A copy of this evaluation report is provided to the requesting physician.

## 2025-07-02 ENCOUNTER — OFFICE VISIT (OUTPATIENT)
Dept: ORTHOPEDICS | Facility: CLINIC | Age: 55
End: 2025-07-02
Payer: COMMERCIAL

## 2025-07-02 ENCOUNTER — RESULTS FOLLOW-UP (OUTPATIENT)
Dept: FAMILY MEDICINE | Facility: OTHER | Age: 55
End: 2025-07-02

## 2025-07-02 VITALS — WEIGHT: 200.5 LBS | BODY MASS INDEX: 35.3 KG/M2

## 2025-07-02 DIAGNOSIS — M17.11 PRIMARY OSTEOARTHRITIS OF RIGHT KNEE: Primary | ICD-10-CM

## 2025-07-02 NOTE — LETTER
7/2/2025      Joyce Beck  [address] [facility] [address]      Dear Colleague,    Thank you for referring your patient, Joyce Beck, to the [facility]. Please see a copy of my visit note below.    1 week prior to right knee replacement    H&P and labs reviewed: completed yesterday. Optimized. No red flags.     CT completed on 5/6/25    Plan for Physical therapy: scheduled to start 7/11/25 with [place]    Plan for discharge: Day 0 discharge. Has family at home to help.     Medications/Allergies: pencillin caused swelling and seizures. She reports has had facial swelling when exposed to penicillin that has been added to foods.     Given the severity of the reaction will plan for clindamycin.     Medication for DVT prophylaxis: ASA     At the last visit we discussed a left knee steroid injection today. She elected to hold off on this at this time.      PERRY Diaz, CNP  Orthopedic Surgery           Again, thank you for allowing me to participate in the care of your patient.        Sincerely,        Moises Montoya, DO    Electronically signed

## 2025-07-03 ENCOUNTER — MYC MEDICAL ADVICE (OUTPATIENT)
Dept: SURGERY | Facility: CLINIC | Age: 55
End: 2025-07-03
Payer: COMMERCIAL

## 2025-07-07 ENCOUNTER — ANESTHESIA EVENT (OUTPATIENT)
Dept: SURGERY | Facility: CLINIC | Age: 55
End: 2025-07-07
Payer: COMMERCIAL

## 2025-07-07 NOTE — ANESTHESIA PREPROCEDURE EVALUATION
Anesthesia Pre-Procedure Evaluation    Patient: Joyce Beck   MRN: [id number] : 1970          Procedure : Procedure(s):  Right ariana assisted total knee replacement         Past Medical History:   Diagnosis Date    Unspecified hypothyroidism       Past Surgical History:   Procedure Laterality Date    COLONOSCOPY N/A 2025    Procedure: Colonoscopy with biopsy;  Surgeon: Randall Adorno MD;  Location:  GI    [facility] LIGATE FALLOPIAN TUBE        Allergies   Allergen Reactions    Penicillins Other (See Comments) and Swelling     Seizure       Social History     Tobacco Use    Smoking status: Never     Passive exposure: Never    Smokeless tobacco: Never   Substance Use Topics    Alcohol use: No      Wt Readings from Last 1 Encounters:   25 90.9 kg (200 lb 8 oz)        Anesthesia Evaluation   Pt has had prior anesthetic. Type: MAC and General.    No history of anesthetic complications       ROS/MED HX  ENT/Pulmonary:     (+)                     Mild Persistent, asthma                  Neurologic:  - neg neurologic ROS     Cardiovascular:     (+) Dyslipidemia - -   -  - -                                 Previous cardiac testing   Echo: Date: Results:    Stress Test:  Date: Results:    ECG Reviewed:  Date: 25 Results:  Sinus Rhythm   WITHIN NORMAL LIMITS    Cath:  Date: Results:      METS/Exercise Tolerance:     Hematologic:  - neg hematologic  ROS     Musculoskeletal:  - neg musculoskeletal ROS     GI/Hepatic:     (+)        bowel prep,            Renal/Genitourinary:  - neg Renal ROS     Endo:     (+)          thyroid problem, hypothyroidism,           Psychiatric/Substance Use:  - neg psychiatric ROS     Infectious Disease:  - neg infectious disease ROS     Malignancy:  - neg malignancy ROS     Other:  - neg other ROS            Physical Exam  Airway  Mallampati: II  TM distance: >3 FB  Neck ROM: full  Upper bite lip test: I  Mouth opening: >= 4 cm  Comments: TMJ clicking with mouth opening  "wide    Cardiovascular   Rhythm: regular  Rate: normal rate     Dental   (+) Modest Abnormalities - crowns, retainers, 1 or 2 missing teeth      Pulmonary - normal examBreath sounds clear to auscultation        Neurological - normal exam  She appears awake, alert and oriented x3.    Other Findings Just had a tooth pulled 2 months ago - no implant - denies any loose teeth or any dental issues      OUTSIDE LABS:  CBC:   Lab Results   Component Value Date    WBC 7.6 07/01/2025    WBC 12.1 (H) 08/21/2007    HGB 14.6 07/01/2025    HGB 13.6 08/21/2007    HCT 44.2 07/01/2025    HCT 41.9 08/21/2007     07/01/2025     08/21/2007     BMP:   Lab Results   Component Value Date     11/04/2024     07/28/2005    POTASSIUM 3.8 11/04/2024    POTASSIUM 4.2 07/28/2005    CHLORIDE 100 11/04/2024    CHLORIDE 103 07/28/2005    CO2 25 11/04/2024    CO2 31 07/28/2005    BUN 16.8 11/04/2024    CR 0.85 11/04/2024    GLC 83 11/04/2024    GLC 95 03/07/2002     COAGS: No results found for: \"PTT\", \"INR\", \"FIBR\"  POC: No results found for: \"BGM\", \"HCG\", \"HCGS\"  HEPATIC: No results found for: \"ALBUMIN\", \"PROTTOTAL\", \"ALT\", \"AST\", \"GGT\", \"ALKPHOS\", \"BILITOTAL\", \"BILIDIRECT\", \"TRINITY\"  OTHER:   Lab Results   Component Value Date    A1C 5.3 07/01/2025    AMY 10.2 11/04/2024    TSH 4.13 07/01/2025    T4 1.45 02/05/2025       Anesthesia Plan    ASA Status:  2      NPO Status: NPO Appropriate   Anesthesia Type: Spinal.  Airway: natural airway, supraglottic airway.  Induction: intravenous.  Maintenance: TIVA.   Techniques and Equipment:     - Airway:  Planned airway equipment includes supraglottic airway.     - Monitoring Plan: standard ASA monitoring     Consents    Anesthesia Plan(s) and associated risks, benefits, and realistic alternatives discussed. Questions answered and patient/representative(s) expressed understanding.     - Discussed: CRNA     - Discussed with:  Patient        - Pt is DNR/DNI Status: no DNR     Blood " "Consent:      - Discussed with: patient.     - Consented: consented to blood products     Postoperative Care    Pain management: plan for postoperative opioid use, peripheral nerve block, multimodal analgesia.     Comments:    Other Comments:  The risks and benefits of spinal anesthesia and Propofol sedation, and the alternatives where applicable, have been discussed with the patient, and she wishes to proceed accepting of risks.                    PERRY Retana CRNA    I have reviewed the pertinent notes and labs in the chart from the past 30 days and (re)examined the patient.  Any updates or changes from those notes are reflected in this note.    Clinically Significant Risk Factors Present on Admission                             # Obesity: Estimated body mass index is 35.3 kg/m  as calculated from the following:    Height as of 7/1/25: 1.605 m (5' 3.19\").    Weight as of 7/2/25: 90.9 kg (200 lb 8 oz).                    "

## 2025-07-08 ENCOUNTER — ANESTHESIA (OUTPATIENT)
Dept: SURGERY | Facility: CLINIC | Age: 55
End: 2025-07-08
Payer: COMMERCIAL

## 2025-07-08 ENCOUNTER — HOSPITAL ENCOUNTER (OUTPATIENT)
Facility: CLINIC | Age: 55
Discharge: HOME | End: 2025-07-08
Attending: ORTHOPAEDIC SURGERY | Admitting: ORTHOPAEDIC SURGERY
Payer: COMMERCIAL

## 2025-07-08 ENCOUNTER — APPOINTMENT (OUTPATIENT)
Dept: GENERAL RADIOLOGY | Facility: CLINIC | Age: 55
End: 2025-07-08
Attending: NURSE PRACTITIONER
Payer: COMMERCIAL

## 2025-07-08 VITALS
OXYGEN SATURATION: 100 % | DIASTOLIC BLOOD PRESSURE: 68 MMHG | RESPIRATION RATE: 16 BRPM | SYSTOLIC BLOOD PRESSURE: 100 MMHG | HEART RATE: 70 BPM | TEMPERATURE: 97.2 F

## 2025-07-08 DIAGNOSIS — Z96.651 S/P TOTAL KNEE REPLACEMENT NOT USING CEMENT, RIGHT: Primary | ICD-10-CM

## 2025-07-08 LAB — GLUCOSE BLDC GLUCOMTR-MCNC: 85 MG/DL (ref 70–99)

## 2025-07-08 PROCEDURE — 258N000003 HC RX IP 258 OP 636: Performed by: ORTHOPAEDIC SURGERY

## 2025-07-08 PROCEDURE — 258N000001 HC RX 258: Performed by: ORTHOPAEDIC SURGERY

## 2025-07-08 PROCEDURE — 82962 GLUCOSE BLOOD TEST: CPT

## 2025-07-08 PROCEDURE — 999N000141 HC STATISTIC PRE-PROCEDURE NURSING ASSESSMENT: Performed by: ORTHOPAEDIC SURGERY

## 2025-07-08 PROCEDURE — 360N000080 HC SURGERY LEVEL 7, PER MIN: Performed by: ORTHOPAEDIC SURGERY

## 2025-07-08 PROCEDURE — 97110 THERAPEUTIC EXERCISES: CPT | Mod: GP | Performed by: PHYSICAL THERAPIST

## 2025-07-08 PROCEDURE — C1713 ANCHOR/SCREW BN/BN,TIS/BN: HCPCS | Performed by: ORTHOPAEDIC SURGERY

## 2025-07-08 PROCEDURE — 710N000012 HC RECOVERY PHASE 2, PER MINUTE: Performed by: ORTHOPAEDIC SURGERY

## 2025-07-08 PROCEDURE — 258N000003 HC RX IP 258 OP 636: Performed by: NURSE ANESTHETIST, CERTIFIED REGISTERED

## 2025-07-08 PROCEDURE — 272N000001 HC OR GENERAL SUPPLY STERILE: Performed by: ORTHOPAEDIC SURGERY

## 2025-07-08 PROCEDURE — 250N000009 HC RX 250: Performed by: NURSE ANESTHETIST, CERTIFIED REGISTERED

## 2025-07-08 PROCEDURE — 250N000009 HC RX 250: Performed by: ORTHOPAEDIC SURGERY

## 2025-07-08 PROCEDURE — 250N000011 HC RX IP 250 OP 636: Performed by: NURSE ANESTHETIST, CERTIFIED REGISTERED

## 2025-07-08 PROCEDURE — C1776 JOINT DEVICE (IMPLANTABLE): HCPCS | Performed by: ORTHOPAEDIC SURGERY

## 2025-07-08 PROCEDURE — 250N000011 HC RX IP 250 OP 636: Performed by: NURSE PRACTITIONER

## 2025-07-08 PROCEDURE — 250N000013 HC RX MED GY IP 250 OP 250 PS 637: Performed by: NURSE PRACTITIONER

## 2025-07-08 PROCEDURE — 370N000017 HC ANESTHESIA TECHNICAL FEE, PER MIN: Performed by: ORTHOPAEDIC SURGERY

## 2025-07-08 PROCEDURE — 97161 PT EVAL LOW COMPLEX 20 MIN: CPT | Mod: GP | Performed by: PHYSICAL THERAPIST

## 2025-07-08 PROCEDURE — 710N000010 HC RECOVERY PHASE 1, LEVEL 2, PER MIN: Performed by: ORTHOPAEDIC SURGERY

## 2025-07-08 PROCEDURE — 999N000063 XR KNEE PORT RIGHT 1/2 VIEWS: Mod: RT

## 2025-07-08 PROCEDURE — 250N000011 HC RX IP 250 OP 636: Performed by: ORTHOPAEDIC SURGERY

## 2025-07-08 PROCEDURE — 97116 GAIT TRAINING THERAPY: CPT | Mod: GP | Performed by: PHYSICAL THERAPIST

## 2025-07-08 PROCEDURE — 97530 THERAPEUTIC ACTIVITIES: CPT | Mod: GP | Performed by: PHYSICAL THERAPIST

## 2025-07-08 PROCEDURE — 271N000001 HC OR GENERAL SUPPLY NON-STERILE: Performed by: ORTHOPAEDIC SURGERY

## 2025-07-08 DEVICE — PIN FIXATION L110 MM OD4 MM BONE STERILE 144110: Type: IMPLANTABLE DEVICE | Site: KNEE | Status: FUNCTIONAL

## 2025-07-08 DEVICE — PIN FIXATION STRAIGHT L140 MM OD4 MM KNEE STERILE 144140: Type: IMPLANTABLE DEVICE | Site: KNEE | Status: FUNCTIONAL

## 2025-07-08 DEVICE — IMP BASEPLATE TIBIAL STRK TRIATHLN KNEE SZ 3 5536-B-300: Type: IMPLANTABLE DEVICE | Site: KNEE | Status: FUNCTIONAL

## 2025-07-08 DEVICE — IMPLANTABLE DEVICE: Type: IMPLANTABLE DEVICE | Site: KNEE | Status: FUNCTIONAL

## 2025-07-08 RX ORDER — BUPIVACAINE HCL/EPINEPHRINE 0.5-1:200K
VIAL (ML) INJECTION PRN
Status: DISCONTINUED | OUTPATIENT
Start: 2025-07-08 | End: 2025-07-08

## 2025-07-08 RX ORDER — MEPERIDINE HYDROCHLORIDE 25 MG/ML
12.5 INJECTION INTRAMUSCULAR; INTRAVENOUS; SUBCUTANEOUS EVERY 5 MIN PRN
Status: DISCONTINUED | OUTPATIENT
Start: 2025-07-08 | End: 2025-07-08 | Stop reason: HOSPADM

## 2025-07-08 RX ORDER — OXYCODONE HYDROCHLORIDE 5 MG/1
10 TABLET ORAL EVERY 4 HOURS PRN
Status: DISCONTINUED | OUTPATIENT
Start: 2025-07-08 | End: 2025-07-08 | Stop reason: HOSPADM

## 2025-07-08 RX ORDER — PHENYLEPHRINE HCL IN 0.9% NACL 50MG/250ML
PLASTIC BAG, INJECTION (ML) INTRAVENOUS CONTINUOUS PRN
Status: DISCONTINUED | OUTPATIENT
Start: 2025-07-08 | End: 2025-07-08

## 2025-07-08 RX ORDER — ONDANSETRON 4 MG/1
4 TABLET, ORALLY DISINTEGRATING ORAL EVERY 6 HOURS PRN
Status: DISCONTINUED | OUTPATIENT
Start: 2025-07-08 | End: 2025-07-08 | Stop reason: HOSPADM

## 2025-07-08 RX ORDER — OXYCODONE HYDROCHLORIDE 5 MG/1
5 TABLET ORAL
Status: DISCONTINUED | OUTPATIENT
Start: 2025-07-08 | End: 2025-07-08 | Stop reason: HOSPADM

## 2025-07-08 RX ORDER — FENTANYL CITRATE 50 UG/ML
50 INJECTION, SOLUTION INTRAMUSCULAR; INTRAVENOUS EVERY 5 MIN PRN
Status: DISCONTINUED | OUTPATIENT
Start: 2025-07-08 | End: 2025-07-08 | Stop reason: HOSPADM

## 2025-07-08 RX ORDER — SODIUM CHLORIDE, SODIUM LACTATE, POTASSIUM CHLORIDE, CALCIUM CHLORIDE 600; 310; 30; 20 MG/100ML; MG/100ML; MG/100ML; MG/100ML
INJECTION, SOLUTION INTRAVENOUS CONTINUOUS
Status: DISCONTINUED | OUTPATIENT
Start: 2025-07-08 | End: 2025-07-08 | Stop reason: HOSPADM

## 2025-07-08 RX ORDER — ONDANSETRON 2 MG/ML
INJECTION INTRAMUSCULAR; INTRAVENOUS PRN
Status: DISCONTINUED | OUTPATIENT
Start: 2025-07-08 | End: 2025-07-08

## 2025-07-08 RX ORDER — ONDANSETRON 2 MG/ML
4 INJECTION INTRAMUSCULAR; INTRAVENOUS EVERY 30 MIN PRN
Status: DISCONTINUED | OUTPATIENT
Start: 2025-07-08 | End: 2025-07-08 | Stop reason: HOSPADM

## 2025-07-08 RX ORDER — ACETAMINOPHEN 325 MG/1
975 TABLET ORAL EVERY 8 HOURS
Status: DISCONTINUED | OUTPATIENT
Start: 2025-07-08 | End: 2025-07-08 | Stop reason: HOSPADM

## 2025-07-08 RX ORDER — SENNOSIDES 8.6 MG
325 CAPSULE ORAL 2 TIMES DAILY
Status: DISCONTINUED | OUTPATIENT
Start: 2025-07-08 | End: 2025-07-08 | Stop reason: HOSPADM

## 2025-07-08 RX ORDER — FENTANYL CITRATE 50 UG/ML
INJECTION, SOLUTION INTRAMUSCULAR; INTRAVENOUS PRN
Status: DISCONTINUED | OUTPATIENT
Start: 2025-07-08 | End: 2025-07-08

## 2025-07-08 RX ORDER — AMOXICILLIN 250 MG
1-2 CAPSULE ORAL 2 TIMES DAILY
Qty: 30 TABLET | Refills: 0 | Status: SHIPPED | OUTPATIENT
Start: 2025-07-08

## 2025-07-08 RX ORDER — DEXAMETHASONE SODIUM PHOSPHATE 10 MG/ML
4 INJECTION, SOLUTION INTRAMUSCULAR; INTRAVENOUS
Status: DISCONTINUED | OUTPATIENT
Start: 2025-07-08 | End: 2025-07-08 | Stop reason: HOSPADM

## 2025-07-08 RX ORDER — FENTANYL CITRATE 50 UG/ML
25 INJECTION, SOLUTION INTRAMUSCULAR; INTRAVENOUS
Status: DISCONTINUED | OUTPATIENT
Start: 2025-07-08 | End: 2025-07-08 | Stop reason: HOSPADM

## 2025-07-08 RX ORDER — CLINDAMYCIN PHOSPHATE 900 MG/50ML
900 INJECTION, SOLUTION INTRAVENOUS SEE ADMIN INSTRUCTIONS
Status: DISCONTINUED | OUTPATIENT
Start: 2025-07-08 | End: 2025-07-08 | Stop reason: HOSPADM

## 2025-07-08 RX ORDER — SENNOSIDES 8.6 MG
325 CAPSULE ORAL 2 TIMES DAILY
Qty: 84 TABLET | Refills: 0 | Status: SHIPPED | OUTPATIENT
Start: 2025-07-08 | End: 2025-08-19

## 2025-07-08 RX ORDER — TIZANIDINE 2 MG/1
2-4 TABLET ORAL EVERY 8 HOURS PRN
Status: DISCONTINUED | OUTPATIENT
Start: 2025-07-08 | End: 2025-07-08 | Stop reason: HOSPADM

## 2025-07-08 RX ORDER — TRANEXAMIC ACID 650 MG/1
1950 TABLET ORAL ONCE
Status: COMPLETED | OUTPATIENT
Start: 2025-07-08 | End: 2025-07-08

## 2025-07-08 RX ORDER — FENTANYL CITRATE-0.9 % NACL/PF 10 MCG/ML
100 PLASTIC BAG, INJECTION (ML) INTRAVENOUS EVERY 5 MIN PRN
Status: DISCONTINUED | OUTPATIENT
Start: 2025-07-08 | End: 2025-07-08 | Stop reason: HOSPADM

## 2025-07-08 RX ORDER — AMOXICILLIN 250 MG
1 CAPSULE ORAL 2 TIMES DAILY
Status: DISCONTINUED | OUTPATIENT
Start: 2025-07-08 | End: 2025-07-08 | Stop reason: HOSPADM

## 2025-07-08 RX ORDER — KETOROLAC TROMETHAMINE 30 MG/ML
INJECTION, SOLUTION INTRAMUSCULAR; INTRAVENOUS PRN
Status: DISCONTINUED | OUTPATIENT
Start: 2025-07-08 | End: 2025-07-08

## 2025-07-08 RX ORDER — HYDROXYZINE HYDROCHLORIDE 25 MG/1
25 TABLET, FILM COATED ORAL EVERY 6 HOURS PRN
Status: DISCONTINUED | OUTPATIENT
Start: 2025-07-08 | End: 2025-07-08 | Stop reason: HOSPADM

## 2025-07-08 RX ORDER — FAMOTIDINE 20 MG/1
20 TABLET, FILM COATED ORAL 2 TIMES DAILY
Status: DISCONTINUED | OUTPATIENT
Start: 2025-07-08 | End: 2025-07-08 | Stop reason: HOSPADM

## 2025-07-08 RX ORDER — DIMENHYDRINATE 50 MG/ML
12.5 INJECTION, SOLUTION INTRAMUSCULAR; INTRAVENOUS EVERY 10 MIN PRN
Status: DISCONTINUED | OUTPATIENT
Start: 2025-07-08 | End: 2025-07-08 | Stop reason: HOSPADM

## 2025-07-08 RX ORDER — DIPHENHYDRAMINE HYDROCHLORIDE 50 MG/ML
25 INJECTION, SOLUTION INTRAMUSCULAR; INTRAVENOUS EVERY 6 HOURS PRN
Status: DISCONTINUED | OUTPATIENT
Start: 2025-07-08 | End: 2025-07-08 | Stop reason: HOSPADM

## 2025-07-08 RX ORDER — HYDROMORPHONE HCL IN WATER/PF 6 MG/30 ML
0.4 PATIENT CONTROLLED ANALGESIA SYRINGE INTRAVENOUS
Status: DISCONTINUED | OUTPATIENT
Start: 2025-07-08 | End: 2025-07-08 | Stop reason: HOSPADM

## 2025-07-08 RX ORDER — HYDROMORPHONE HCL IN WATER/PF 6 MG/30 ML
0.4 PATIENT CONTROLLED ANALGESIA SYRINGE INTRAVENOUS EVERY 5 MIN PRN
Status: DISCONTINUED | OUTPATIENT
Start: 2025-07-08 | End: 2025-07-08 | Stop reason: HOSPADM

## 2025-07-08 RX ORDER — OXYCODONE HYDROCHLORIDE 5 MG/1
5 TABLET ORAL EVERY 4 HOURS PRN
Status: DISCONTINUED | OUTPATIENT
Start: 2025-07-08 | End: 2025-07-08 | Stop reason: HOSPADM

## 2025-07-08 RX ORDER — DEXAMETHASONE SODIUM PHOSPHATE 4 MG/ML
INJECTION, SOLUTION INTRA-ARTICULAR; INTRALESIONAL; INTRAMUSCULAR; INTRAVENOUS; SOFT TISSUE PRN
Status: DISCONTINUED | OUTPATIENT
Start: 2025-07-08 | End: 2025-07-08

## 2025-07-08 RX ORDER — DIPHENHYDRAMINE HCL 25 MG
25 CAPSULE ORAL EVERY 6 HOURS PRN
Status: DISCONTINUED | OUTPATIENT
Start: 2025-07-08 | End: 2025-07-08 | Stop reason: HOSPADM

## 2025-07-08 RX ORDER — OXYCODONE HYDROCHLORIDE 5 MG/1
10 TABLET ORAL
Status: DISCONTINUED | OUTPATIENT
Start: 2025-07-08 | End: 2025-07-08 | Stop reason: HOSPADM

## 2025-07-08 RX ORDER — LIDOCAINE HYDROCHLORIDE 20 MG/ML
INJECTION, SOLUTION INFILTRATION; PERINEURAL PRN
Status: DISCONTINUED | OUTPATIENT
Start: 2025-07-08 | End: 2025-07-08

## 2025-07-08 RX ORDER — HYDRALAZINE HYDROCHLORIDE 20 MG/ML
2.5-5 INJECTION INTRAMUSCULAR; INTRAVENOUS EVERY 10 MIN PRN
Status: DISCONTINUED | OUTPATIENT
Start: 2025-07-08 | End: 2025-07-08 | Stop reason: HOSPADM

## 2025-07-08 RX ORDER — NALOXONE HYDROCHLORIDE 0.4 MG/ML
0.1 INJECTION, SOLUTION INTRAMUSCULAR; INTRAVENOUS; SUBCUTANEOUS
Status: DISCONTINUED | OUTPATIENT
Start: 2025-07-08 | End: 2025-07-08 | Stop reason: HOSPADM

## 2025-07-08 RX ORDER — BUPIVACAINE HYDROCHLORIDE 7.5 MG/ML
INJECTION, SOLUTION INTRASPINAL PRN
Status: DISCONTINUED | OUTPATIENT
Start: 2025-07-08 | End: 2025-07-08

## 2025-07-08 RX ORDER — ONDANSETRON 4 MG/1
4 TABLET, ORALLY DISINTEGRATING ORAL EVERY 30 MIN PRN
Status: DISCONTINUED | OUTPATIENT
Start: 2025-07-08 | End: 2025-07-08 | Stop reason: HOSPADM

## 2025-07-08 RX ORDER — ONDANSETRON 2 MG/ML
4 INJECTION INTRAMUSCULAR; INTRAVENOUS EVERY 6 HOURS PRN
Status: DISCONTINUED | OUTPATIENT
Start: 2025-07-08 | End: 2025-07-08 | Stop reason: HOSPADM

## 2025-07-08 RX ORDER — PROCHLORPERAZINE MALEATE 5 MG/1
10 TABLET ORAL EVERY 6 HOURS PRN
Status: DISCONTINUED | OUTPATIENT
Start: 2025-07-08 | End: 2025-07-08 | Stop reason: HOSPADM

## 2025-07-08 RX ORDER — HYDROMORPHONE HCL IN WATER/PF 6 MG/30 ML
0.2 PATIENT CONTROLLED ANALGESIA SYRINGE INTRAVENOUS
Status: DISCONTINUED | OUTPATIENT
Start: 2025-07-08 | End: 2025-07-08 | Stop reason: HOSPADM

## 2025-07-08 RX ORDER — OXYCODONE HYDROCHLORIDE 5 MG/1
5-10 TABLET ORAL EVERY 4 HOURS PRN
Qty: 30 TABLET | Refills: 0 | Status: SHIPPED | OUTPATIENT
Start: 2025-07-08

## 2025-07-08 RX ORDER — ACETAMINOPHEN 325 MG/1
975 TABLET ORAL ONCE
Status: DISCONTINUED | OUTPATIENT
Start: 2025-07-08 | End: 2025-07-08

## 2025-07-08 RX ORDER — CLINDAMYCIN PHOSPHATE 900 MG/50ML
900 INJECTION, SOLUTION INTRAVENOUS EVERY 8 HOURS
Status: DISCONTINUED | OUTPATIENT
Start: 2025-07-08 | End: 2025-07-08 | Stop reason: HOSPADM

## 2025-07-08 RX ORDER — FENTANYL CITRATE 50 UG/ML
25 INJECTION, SOLUTION INTRAMUSCULAR; INTRAVENOUS EVERY 5 MIN PRN
Status: DISCONTINUED | OUTPATIENT
Start: 2025-07-08 | End: 2025-07-08 | Stop reason: HOSPADM

## 2025-07-08 RX ORDER — LEVOTHYROXINE SODIUM 88 UG/1
88 TABLET ORAL DAILY
Status: DISCONTINUED | OUTPATIENT
Start: 2025-07-09 | End: 2025-07-08 | Stop reason: HOSPADM

## 2025-07-08 RX ORDER — CLINDAMYCIN PHOSPHATE 900 MG/50ML
900 INJECTION, SOLUTION INTRAVENOUS
Status: COMPLETED | OUTPATIENT
Start: 2025-07-08 | End: 2025-07-08

## 2025-07-08 RX ORDER — HYDROXYZINE HYDROCHLORIDE 25 MG/1
25 TABLET, FILM COATED ORAL EVERY 6 HOURS PRN
Qty: 30 TABLET | Refills: 0 | Status: SHIPPED | OUTPATIENT
Start: 2025-07-08

## 2025-07-08 RX ORDER — HYDROMORPHONE HCL IN WATER/PF 6 MG/30 ML
0.2 PATIENT CONTROLLED ANALGESIA SYRINGE INTRAVENOUS EVERY 5 MIN PRN
Status: DISCONTINUED | OUTPATIENT
Start: 2025-07-08 | End: 2025-07-08 | Stop reason: HOSPADM

## 2025-07-08 RX ORDER — LIDOCAINE 40 MG/G
CREAM TOPICAL
Status: DISCONTINUED | OUTPATIENT
Start: 2025-07-08 | End: 2025-07-08 | Stop reason: HOSPADM

## 2025-07-08 RX ORDER — POLYETHYLENE GLYCOL 3350 17 G/17G
17 POWDER, FOR SOLUTION ORAL DAILY
Status: DISCONTINUED | OUTPATIENT
Start: 2025-07-09 | End: 2025-07-08 | Stop reason: HOSPADM

## 2025-07-08 RX ORDER — PROPOFOL 10 MG/ML
INJECTION, EMULSION INTRAVENOUS CONTINUOUS PRN
Status: DISCONTINUED | OUTPATIENT
Start: 2025-07-08 | End: 2025-07-08

## 2025-07-08 RX ORDER — ACETAMINOPHEN 325 MG/1
325-650 TABLET ORAL EVERY 6 HOURS PRN
Status: ON HOLD | COMMUNITY
End: 2025-07-08

## 2025-07-08 RX ORDER — BISACODYL 10 MG
10 SUPPOSITORY, RECTAL RECTAL DAILY PRN
Status: DISCONTINUED | OUTPATIENT
Start: 2025-07-11 | End: 2025-07-08 | Stop reason: HOSPADM

## 2025-07-08 RX ORDER — ALBUTEROL SULFATE 0.83 MG/ML
2.5 SOLUTION RESPIRATORY (INHALATION) EVERY 4 HOURS PRN
Status: DISCONTINUED | OUTPATIENT
Start: 2025-07-08 | End: 2025-07-08 | Stop reason: HOSPADM

## 2025-07-08 RX ORDER — ACETAMINOPHEN 325 MG/1
975 TABLET ORAL EVERY 8 HOURS PRN
Qty: 70 TABLET | Refills: 1 | Status: SHIPPED | OUTPATIENT
Start: 2025-07-08

## 2025-07-08 RX ADMIN — FENTANYL CITRATE 50 MCG: 50 INJECTION, SOLUTION INTRAMUSCULAR; INTRAVENOUS at 07:27

## 2025-07-08 RX ADMIN — HYDROXYZINE HYDROCHLORIDE 25 MG: 25 TABLET, FILM COATED ORAL at 10:41

## 2025-07-08 RX ADMIN — ONDANSETRON 4 MG: 2 INJECTION INTRAMUSCULAR; INTRAVENOUS at 09:05

## 2025-07-08 RX ADMIN — OXYCODONE 5 MG: 5 TABLET ORAL at 14:36

## 2025-07-08 RX ADMIN — DEXAMETHASONE SODIUM PHOSPHATE 10 MG: 4 INJECTION, SOLUTION INTRA-ARTICULAR; INTRALESIONAL; INTRAMUSCULAR; INTRAVENOUS; SOFT TISSUE at 09:05

## 2025-07-08 RX ADMIN — FAMOTIDINE 20 MG: 20 TABLET, FILM COATED ORAL at 14:40

## 2025-07-08 RX ADMIN — SODIUM CHLORIDE, SODIUM LACTATE, POTASSIUM CHLORIDE, AND CALCIUM CHLORIDE: .6; .31; .03; .02 INJECTION, SOLUTION INTRAVENOUS at 07:07

## 2025-07-08 RX ADMIN — BUPIVACAINE HYDROCHLORIDE IN DEXTROSE 1.4 ML: 7.5 INJECTION, SOLUTION SUBARACHNOID at 07:38

## 2025-07-08 RX ADMIN — LIDOCAINE HYDROCHLORIDE 50 MG: 20 INJECTION, SOLUTION INFILTRATION; PERINEURAL at 07:47

## 2025-07-08 RX ADMIN — ASPIRIN 325 MG: 325 TABLET ORAL at 14:36

## 2025-07-08 RX ADMIN — PROPOFOL 75 MCG/KG/MIN: 10 INJECTION, EMULSION INTRAVENOUS at 07:38

## 2025-07-08 RX ADMIN — KETOROLAC TROMETHAMINE 30 MG: 30 INJECTION, SOLUTION INTRAMUSCULAR at 09:05

## 2025-07-08 RX ADMIN — CLINDAMYCIN PHOSPHATE 900 MG: 900 INJECTION, SOLUTION INTRAVENOUS at 07:16

## 2025-07-08 RX ADMIN — SENNOSIDES AND DOCUSATE SODIUM 1 TABLET: 50; 8.6 TABLET ORAL at 14:40

## 2025-07-08 RX ADMIN — BUPIVACAINE HYDROCHLORIDE AND EPINEPHRINE BITARTRATE 20 ML: 5; .005 INJECTION, SOLUTION PERINEURAL at 09:37

## 2025-07-08 RX ADMIN — OXYCODONE 5 MG: 5 TABLET ORAL at 10:41

## 2025-07-08 RX ADMIN — ACETAMINOPHEN 975 MG: 325 TABLET ORAL at 10:41

## 2025-07-08 RX ADMIN — TRANEXAMIC ACID 1950 MG: 650 TABLET ORAL at 06:37

## 2025-07-08 RX ADMIN — CLINDAMYCIN PHOSPHATE 900 MG: 900 INJECTION, SOLUTION INTRAVENOUS at 13:55

## 2025-07-08 RX ADMIN — FENTANYL CITRATE 25 MCG: 50 INJECTION INTRAMUSCULAR; INTRAVENOUS at 07:38

## 2025-07-08 RX ADMIN — Medication 0.1 MCG/KG/MIN: at 07:56

## 2025-07-08 RX ADMIN — MIDAZOLAM 1 MG: 1 INJECTION INTRAMUSCULAR; INTRAVENOUS at 07:27

## 2025-07-08 ASSESSMENT — ACTIVITIES OF DAILY LIVING (ADL)
ADLS_ACUITY_SCORE: 34
ADLS_ACUITY_SCORE: 34
ADLS_ACUITY_SCORE: 32
ADLS_ACUITY_SCORE: 34
ADLS_ACUITY_SCORE: 32
ADLS_ACUITY_SCORE: 34
ADLS_ACUITY_SCORE: 32
ADLS_ACUITY_SCORE: 34
ADLS_ACUITY_SCORE: 32
ADLS_ACUITY_SCORE: 32
ADLS_ACUITY_SCORE: 34

## 2025-07-08 NOTE — DISCHARGE INSTRUCTIONS
You had 30 mg of IV Toradol at 9:05 am, NO Ibuprofen containing medicines for 6 hours. -Next at 3:00 pm.    [facility]   Adult Discharge Orders & Instructions     For 24 hours after surgery    Get plenty of rest.  A responsible adult must stay with you for at least 24 hours after you leave the hospital.   Do not drive or use heavy equipment.  If you have weakness or tingling, don't drive or use heavy equipment until this feeling goes away.  Do not drink alcohol.  Avoid strenuous or risky activities.  Ask for help when climbing stairs.   You may feel lightheaded.  If so, sit for a few minutes before standing.  Have someone help you get up.   You may have a slight fever. Call the doctor if your fever is over 100 F (37.7 C) (taken under the tongue) or lasts longer than 24 hours.  You may have a dry mouth, a sore throat, muscle aches or trouble sleeping.  These should go away after 24 hours.  Do not make important or legal decisions.  We don t expect you to have any problems from the surgery or treatment you had today. Just in case, here s what to do if you have pain, upset stomach (nausea), bleeding or infection:  Pain:  Take medicines your doctor has prescribed or over-the-counter medicine they have suggested. Resting and using ice packs can help, too. For surgery on an arm or leg, raise it on a pillow to ease swelling. Call your doctor if these methods don t work.  Copyright [employer], Licensed under [employer]  Upset stomach (nausea):  Take anti-nausea medicine approved by your doctor. Drink clear liquids like apple juice, ginger ale, broth or 7-Up. Be sure to drink enough fluids. Rest can help, too. Move to normal foods when you re ready.   Bleeding:  In the first 24 hours, you may see a little blood on your dressing, about the size of a quarter. You don t need to worry about this much blood, but if the blood spot keeps getting bigger:  Put pressure on the wound if you can,  AND  Call your doctor.  Copyright [employer], Licensed under [employer]  Fever/Infection: Please call your doctor if you have any of these signs:  Redness  Swelling  Wound feels warm  Pain gets worse  Bad-smelling fluid leaks from wound  Fever or chills  Call your doctor for any of the followin.  It has been over 8 to 10 hours since surgery and you are still not able to urinate (pass water).    2.  Headache for over 24 hours.    3.  Numbness, tingling or weakness in your legs the day after surgery (if you had spinal anesthesia).    For patient questions :      Orthopedics: [phone]      24 Hour Nurse Advice Line [phone]            Total Knee Replacement Discharge Instructions  [phone] Bone and Joint Service Line for issues or concerns      General Care:  After surgery you may feel tired/sleepy. This is normal. If you have any question along the way please contact the office. If you feel it is an issue cannot wait for normal office hours, contact the 24 hour bone and joint line at [phone]. You should not drive or operate machines/equipment until released by your physician to do so.     Wound Care:  Keep incision covered with hospital dressing (Aquacel) for one week. It is okay to shower with this dressing on. However, do not submerge your dressing and incision in water for roughly 2 weeks. After one week remove this dressing and then keep it clean, dry, and covered. If this dressing become looses or falls off it is ok to cover with gauze and tape.  Wash the incision gently with warm soapy water after removing your hospital dressing and lightly pat dry.       Diet:  Start with non-alcoholic liquids at first, particularly water or sports drinks after surgery. Progress to bland foods such as crackers and bread and finally to your normal diet if you have no problems. Avoid alcohol when taking narcotic pain medications.      Pain control:  Take your pain medications as prescribed. These  medications may make you sleepy. Do not drive, operate equipment, or drink alcohol when taking these.  You may take Tylenol (Generic name is acetaminophen) as directed on the bottle for additional relief or in place of the prescribed pain medications as your pain gets better. Do not take any other NSAIDs (Motrin, Ibuprofen, Aleve, Naproxen) while taking the blood thinner. If the medications cause a reaction such as nausea or skin rash, stop taking them and contact your doctor. Please plan accordingly, pain medications will not be re-filled on the weekends or at night. Call the office during the day if you need more medications.    Blood thinner:  It is very important to take it as prescribed. It is a medication to help prevent blood clots in your legs or lungs. No medication is perfect, so if you notice a sudden onset of pain/swelling in your calf area call your doctor. If you notice a sudden onset of troubles breathing and/or chest pain call 911.     Swelling (edema) control:  Preventing swelling (edema) in your legs after surgery is very important. It is helpful for achieving optimal range of motion as well as preventing blood clots.  It starts with simple things such as elevation of your legs and icing. Elevate your legs above your heart.    Do this for 20 minutes every couple hours the first few days after surgery. We also recommend DEREK hose (compression hose) to be worn. Wear on both legs during the day. You may remove at night. Wear these until directed to stop.      Icing:  It is common for some swelling, aching and stiffness to occur for up to 6-9 months after a knee replacement. If you knee swells, get off your feet, elevate your leg and apply some ice packs. Apply for 20 minutes at a time. For the first 1-2 weeks apply ice 2-3 x day or more after therapy.    Walker/crutches:  Use a walker/crutches when you go home. You will transition to the use of a cane and finally to no additional support.      Braces:  You will go home with a knee immobilizer. You can take it off when you are lying or sitting down. Wear it when you are walking. This immobilizer can come off after you are doing a straight leg raise. Your physician and or physical therapist will help to determine when to stop wearing it.     Physical Therapy:  The success of your knee replacement is based on doing physical therapy. You will have some pain and discomfort along the way. If you feel your pain is limiting your progress make sure to take some pain medication prior to your therapy session. If you pain medications are not working talk to your surgeon.   The goal is to work on range of motion. While it is important to working on bending your knee, it is equally important to make sure you knee comes out all the way straight. To assist in this do not place any bumps, pillows and or blankets under your knee when you are lying down. You should have an outpatient physical therapy appointment scheduled for about 1 week after surgery.     Activity:  Unless otherwise instructed, you can weight bear as tolerated. While laying or sitting down you should straighten your knee all the way out and then gently work on bending the knee back. Do not worry at first if your knee feels stiff and will not bend like normal, this will get better. Never put a pillow or bump under you knee, instead put a pillow under your ankle so your knee will straighten out all the way.     Normal findings after surgery:  Numbness and tenderness around the incisions and to the outside of the incision is normal.  You may have bruising around the incisions and down the lower leg.   Your knee will be swollen for months after surgery. It will feel  tight  to move.   Low grade fevers less than 100.5 degrees Fahrenheit are normal.   You may have some minor swelling in the leg/calf area.  You will have some increased pain after your therapy sessions.     When to call the  Office:  Temperature greater than 101.5 degrees Fahreheit.  Fever, chills, and increasing pain in the knee.  Excessive drainage from the incisions that include bright red blood.  Drainage from the incisions sites that appear yellow, pus-like, or foul smelling.  Increasing pain the knee not relieved by the prescribed pain medications or ice.  Persistent nausea or vomiting not helped by the Zofran.  Increased pain or swelling in your calf area (in back above your ankle) that wasn t there when in the hospital.  Any other effects you feel are significant.  Call 911 if you experience any chest pain and/or shortness or breath.

## 2025-07-08 NOTE — ANESTHESIA PROCEDURE NOTES
Adductor canal Procedure Note    Pre-Procedure   Staff -        Performed By: CRNA       Location: post-op       Pre-Anesthestic Checklist: patient identified, IV checked, site marked, risks and benefits discussed, informed consent, monitors and equipment checked, pre-op evaluation, at physician/surgeon's request and post-op pain management  Timeout:       Correct Patient: Yes        Correct Procedure: Yes        Correct Site: Yes        Correct Position: Yes        Correct Laterality: Yes        Site Marked: Yes  Procedure Documentation  Procedure: Adductor canal         Laterality: right       Patient Position: supine       Patient Prep/Sterile Barriers: sterile gloves, mask       Skin prep: Chloraprep       Local skin infiltrated with 3 mL of 2% lidocaine.        Needle Type: insulated       Needle Gauge: 22.        Needle Length (millimeters): 100        Ultrasound guided       1. Ultrasound was used to identify targeted nerve, plexus, vascular marker, or fascial plane and place a needle adjacent to it in real-time.       2. Ultrasound was used to visualize the spread of anesthetic in close proximity to the above referenced structure.       3. A permanent image is entered into the patient's record.    Assessment/Narrative         The placement was negative for: blood aspirated, painful injection and site bleeding       Paresthesias: No.       Test dose of mL at.         Test dose negative, 3 minutes after injection, for signs of intravascular, subdural, or intrathecal injection.       Bolus given via needle..        Secured via.        Insertion/Infusion Method: Single Shot       Complications: none       Injection made incrementally with aspirations every 5 mL.     Comments:  Placed with the assistance of an RN.  Good visualization of the femoral artery and the local spread lateral to the artery.  No HR increase with injections and no other complications noted.  I will follow up with the pt if needed.      FOR  "[facility] ([facility]) ONLY:   Pain Team Contact information: please page the Pain Team Via [employer]. Search \"Pain\". During daytime hours, please page the attending first. At night please page the resident first.      "

## 2025-07-08 NOTE — PROCEDURES
[facility] Operative Report    Pre-operative diagnosis: right knee primary osteoarthritis   Post-operative diagnosis: same  Procedure: Procedure(s): right total knee arthroplasty-robotic assisted (patella not resurfaced)  Surgeon: Moises Montoya DO  Anesthesia: spinal  Assistant(s): PERRY Cantu, CNP (A advanced practice provider was necessary for his expertise, exposure and surgical assistance throughout the case.)  Estimated blood loss: Less than 10ml  Urine output: na  Fluids: 600ml ml crystalloids   Specimens: none  Complications: None  Counts: correct     Findings: Bone-on-bone medial compartment arthritis with large rimming osteophytes.  Full-thickness cartilage loss through the lateral femoral condyle weightbearing portion.  Native measurements were 6 degrees flexion contracture with 10.5 degrees of varus.  Native femoral joint line 2.8 degrees of valgus with native tibial joint line 4.9 degrees of varus.  Final measurements was 1.0 degrees flexion contracture with 6 point degrees of varus with the components placed at 1 degree of valgus on the femur and 6.5 degrees of varus on the tibia.    Implants:    Slater size 3 triathlon cruciate retaining femoral component, 9 mm thickness X3 tibial bearing insert, size 3 tibial TriTanium baseplate triathlon      Joyce Beck is a pleasant 55 year old-year-old patient with a complaint of knee pain.  The pain is been progressive and longstanding.  Despite conservative care the pain is impacting their quality of life.  The radiographs are clinically correlated.  Given their continued pain refractory to conservative care we discussed proceeding with Calos assisted total knee arthroplasty.  The risk, benefits, complications, alternatives was reviewed in the office.  The risk including bleeding, infection, neurovascular injury, fracture, blood clot, stiffness, implant issues was discussed.  We also reviewed length of recovery.  Once this was thoroughly  reviewed with an understanding, the patient opted to proceed surgically.    They were seen preoperatively and again informed consent was obtained.  The extremity was marked and the consent was signed.  They were wheeled back to an operative suite.  Safely transferred to an operative table.  When deemed appropriate by anesthesia the extremity was sterilely prepped and draped in normal manner.  Prior to beginning, a timeout was performed.  The appropriate patient, laterality, surgery was identified.  Antibiotics were confirmed to have been administered.  Prior to scrubbing into the surgery, the preoperative plan was evaluated and the sizes were selected with placement of the prosthesis on the Calos system.    An Esmarch was utilized to exsanguinate the extremity and tourniquet was inflated on the upper thigh.  A midline incision was followed with a medial parapatellar arthrotomy.  The anterior fat pad was excised.  No releases were performed.  The ACL excised.  Rimming osteophytes removed.  The checkpoints were placed in the femur as well as the proximal tibia.  This was followed with placement of the arrays including 2 pins intra-articular approximately 1.5 cm proximal to the condyle.  This was followed with 2 pins in the arrays placed approximately 5 cm distal to the knee along the medial tibial crest.  With that completed the knee was registered with the Clearwire robot.  This included obtaining checkpoints along the medial and lateral malleoli as well as obtaining the hip center of rotation.  Once this was completed the knee was evaluated and extension as well as 90 degrees of flexion to determine the extension and flexion gap.  With this completed the gaps were analyzed with the Calos robot.  The gaps were balanced with the robot.  The balancing took into consideration native joint alignment including femoral and tibia native alignment, femoral rotation including careful evaluation of the trochlea with patellar  tracking.  Once this was acceptable the knee was positioned.    The patella was not resurfaced.  Rimming osteophytes were removed.    Using the GROUNDBOOTH robot with care to protect the collateral ligaments and patellar tendon the cuts on the femur were performed with no issues.  Attention was turned to the tibia, the cut on the proximal tibia was performed with no issues.    Trial components were placed.  The knee was taken through full range of motion showing the knee to be well balanced throughout the arc of motion with no evidence of instability or patellar maltracking.    The tibia and femur were then prepared.  The final components as listed above were press-fit into position with a stable interface.  During this process a 3-minute dilute Betadine lavage was performed.  This was followed pulse lavage.       The arthrotomy was then closed with 0 Vicryl in interrupted manner.  The knee was taken through motion showing a tight closure.  This was followed with 2-0 Vicryl subcutaneous followed a running Monocryl subcuticular stitch and skin glue.  A sterile bandage was applied and they were transferred to the PACU in stable condition.  They will be brought in the hospital for continued orthopedic care, DVT prophylaxis, pain management, physical therapy.    Hiram Montoya D.O.

## 2025-07-08 NOTE — INTERVAL H&P NOTE
This H&P has been reviewed and there are no clinically significant changes in the patient s condition.  The patient was evaluated by myself as well as Isai Olson prior to surgery. The Patient is approved for the surgery and the stated surgical procedure is still clinically indicated.

## 2025-07-08 NOTE — ANESTHESIA CARE TRANSFER NOTE
Patient: Joyce Beck    Procedure: Procedure(s):  Right ariana assisted total knee replacement       Diagnosis: Primary osteoarthritis of right knee [M17.11]  Diagnosis Additional Information: No value filed.    Anesthesia Type:   Spinal, MAC, Peripheral Nerve Block     Note:  Anesthesia Care Transfer Notewriter  Vitals:  Vitals Value Taken Time   /64 07/08/25 10:20   Temp 97.34  F (36.3  C) 07/08/25 10:23   Pulse 74 07/08/25 10:23   Resp 14 07/08/25 10:23   SpO2 98 % 07/08/25 10:23   Vitals shown include unfiled device data.    Electronically Signed By: PERRY Henson CRNA  July 8, 2025  10:24 AM

## 2025-07-08 NOTE — OR NURSING
Post op discharge phone call for Dr. BOLANOS discharge patients:  [facility] PACU /inpatient unit    Type of surgery:right TKA  Date of surgery:7/8/25    HI,  I am a registered nurse calling from the Orthopedic unit at [facility], checking in to see how you are doing following your joint surgery.    Is your pain level manageable? yes  Are you having any new or increased numbness or tingling in your surgical leg? No. Similar to what it was when she left  Are you having increased swelling  or drainage around your surgical site or surgical dressing?No  Have you been able to walk short distances around your house? yes  Have you been able to urinate since you have been home? yes  Do you have your first physical therapy session set up? Yes x 3  Do you have any questions or concerns at this time?Yes regarding med schedule    Please call Dr. Montoya  of any concerns as you daily review your stoplight tool for symptom management.  Thank you, have a great recovery!

## 2025-07-08 NOTE — ANESTHESIA POSTPROCEDURE EVALUATION
Patient: Joyce Beck    Procedure: Procedure(s):  Right ariana assisted total knee replacement       Anesthesia Type:  Spinal, MAC, Peripheral Nerve Block    Note:  Disposition: Inpatient   Postop Pain Control: Uneventful            Sign Out: Well controlled pain   PONV: No   Neuro/Psych: Uneventful            Sign Out: Acceptable/Baseline neuro status   Airway/Respiratory: Uneventful            Sign Out: Acceptable/Baseline resp. status   CV/Hemodynamics: Uneventful            Sign Out: Acceptable CV status   Other NRE: NONE   DID A NON-ROUTINE EVENT OCCUR? No    Event details/Postop Comments:  Pt was happy with her anesthesia care.  No complications.  I advised the pt she may have some soreness at the spinal site and this is normal.  However if the soreness continues over a week or if redness is noted around the site to let anesthesia know.  I will follow up with the pt if needed.           Last vitals:  Vitals Value Taken Time   /63 07/08/25 11:10   Temp 96.98  F (36.1  C) 07/08/25 11:10   Pulse 55 07/08/25 11:10   Resp 17 07/08/25 11:10   SpO2 100 % 07/08/25 11:10   Vitals shown include unfiled device data.    Electronically Signed By: PERRY Retana CRNA  July 8, 2025  5:14 PM

## 2025-07-08 NOTE — PROGRESS NOTES
07/08/25 1430   Appointment Info   Signing Clinician's Name / Credentials (PT) Natacha Whitley PT, DPT, ATC, LAT   Rehab Comments (PT) PT eval and treat post op knee. Activity order: ambulate, up in chair, up with assist, IS, ice, immobilizer on at all times, no pivot or twist, WBAT   Quick Adds   Quick Adds Certification;[facility] Auth & Certification       Present no   Living Environment   People in Home alone   Current Living Arrangements house   Home Accessibility stairs to enter home;stairs within home   Number of Stairs, Main Entrance none  (from lower level garage entrance)   Stair Railings, Main Entrance none   Number of Stairs, Within Home, Primary greater than 10 stairs  (15 with single hand rail switched at correction)   Transportation Anticipated family or friend will provide   Living Environment Comments family able to provide support for 1 week around the clock. split level ekaterina eiwth upper level living, step over tub.   Self-Care   Usual Activity Tolerance good   Current Activity Tolerance fair   Regular Exercise No   Equipment Currently Used at Home shower chair;grab bar, toilet   Fall history within last six months no   Activity/Exercise/Self-Care Comment 4WW, standard walker available at home but patient unsure of their condition and seeking a 2WW.   General Information   Onset of Illness/Injury or Date of Surgery 07/08/25   Referring Physician Patel Mcmanus APRN CNP   Patient/Family Therapy Goals Statement (PT) home with OP PT 7/11/25 at [facility]   Pertinent History of Current Problem (include personal factors and/or comorbidities that impact the POC) Patient is a 55 year old female, day 0 status post ariana robot assisted right total knee arthroplasty by Dr. Montoya, 10 mL EBL, spinal with adductor block. Patient with a previous medical history of prediabetes, iron deficiency anemia, hypothyroidism, asthma.   Existing Precautions/Restrictions brace worn when out of  bed;fall   Weight-Bearing Status - LUE full weight-bearing   Weight-Bearing Status - RUE full weight-bearing   General Observations on room air, IV infusing, immobilizer in room   Cognition   Affect/Mental Status (Cognition) WFL   Orientation Status (Cognition) oriented x 4   Follows Commands (Cognition) WFL   Pain Assessment   Patient Currently in Pain Yes, see Vital Sign flowsheet   Integumentary/Edema   Integumentary/Edema Comments post op dressing CDI   Posture    Posture Not impaired   Range of Motion (ROM)   Range of Motion Knee, Left (Group);Knee, Right (Group)   Right Knee Extension/Flexion ROM   Right Knee Extension/Flexion AROM - degrees 0-60   Strength (Manual Muscle Testing)   Strength (Manual Muscle Testing) MMT: Knee   Right Knee (Manual Muscle Testing)   Knee Flexion - Right Side (3/5) fair, right   Knee Extension - Right Side (2+/5) poor plus, right   Comment, Right Knee: Manual Muscle Testing (MMT) unable to SLR flex and weak SAQ   Bed Mobility   Bed Mobility supine-sit;sit-supine   Supine-Sit Hinds (Bed Mobility) verbal cues;independent   Sit-Supine Hinds (Bed Mobility) verbal cues;independent   Transfers   Transfers sit-stand transfer;bed-chair transfer   Maintains Weight-bearing Status (Transfers) able to maintain   Transfer Safety Concerns Noted decreased step length   Impairments Contributing to Impaired Transfers decreased strength;decreased ROM;pain   Bed-Chair Transfer   Bed-Chair Hinds (Transfers) verbal cues;supervision   Assistive Device (Bed-Chair Transfers) walker, front-wheeled   Sit-Stand Transfer   Sit-Stand Hinds (Transfers) supervision;verbal cues   Assistive Device (Sit-Stand Transfers) walker, front-wheeled   Gait/Stairs (Locomotion)   Hinds Level (Gait) supervision;verbal cues   Assistive Device (Gait) walker, front-wheeled   Distance in Feet (Gait) 10   Pattern (Gait) step-to   Deviations/Abnormal Patterns (Gait) stride length decreased;gait  speed decreased;base of support, narrow;antalgic   Maintains Weight-bearing Status (Gait) able to maintain   Balance   Balance Comments IND sitting balance. good transitional balance with immobilizer and walker support. RLE instability present but corrects and stabilizes well   Sensory Examination   Sensory Perception Comments impaired throughout RLE   Coordination   Coordination no deficits were identified   Muscle Tone   Muscle Tone no deficits were identified   Clinical Impression   Criteria for Skilled Therapeutic Intervention Yes, treatment indicated   PT Diagnosis (PT) impaired mobility, muscle weakness, joint stiffness, decreased activity tolerance   Influenced by the following impairments day 0 status post TKA   Functional limitations due to impairments use of walker and knee immobilizer for mobility, fatigue, pain, swelling.   Clinical Presentation (PT Evaluation Complexity) evolving   Clinical Presentation Rationale post op acuity, medical status, clinical judgement   Clinical Decision Making (Complexity) moderate complexity   Planned Therapy Interventions (PT) balance training;bed mobility training;cryotherapy;gait training;home exercise program;patient/family education;ROM (range of motion);stair training;strengthening;transfer training;progressive activity/exercise;home program guidelines   Risk & Benefits of therapy have been explained evaluation/treatment results reviewed;participants voiced agreement with care plan;participants included;patient   Clinical Impression Comments Following today's functional mobility training, education and functional performance patient is able to complete mobility necessary to manage the home environment with assistance available at home. Anticipate the patient will do well with discharge home with progression to outpatient physical therapy in order to progress towards desired level of function in accordance with the surgeon's protocol. No skilled IP OT needs identified  and no additional IP PT needs.   PT Total Evaluation Time   PT Eval, Low Complexity Minutes (60597) 10   Therapy Certification   Start of care date 07/08/25   Certification date from 07/08/25   Certification date to 07/08/25   Medical Diagnosis s/p TKA   [facility] Authorization Information   Condition type Initial onset (within last 3 months)   Cause of current episode Post Surgical   Type of surgery 5-Joint Replacement   Nature of treatment Rehabilitative   Functional ability Severe functional limitations   Documented POC (choose all that apply) Patient agrees to program participation including home program;Frequency of treatment visits and treatment activities to address deficit areas.;Measurable short and long term/discharge treatment goals related to physical and functional deficits.   Briefly describe symptoms post op pain, swelling, weakness   How did the symptoms start post surgical   Last 24H: avg pain/symptom intensity  5/10   Past wk: avg pain/symptom intensity 5/10   Frequency of Symptoms Constantly (% of the time)   Symptom impact on ADLs Extremely   Condition change since eval N/A (first visit)   General health reported by patient Very good   Physical Therapy Goals   PT Frequency Daily   PT Predicted Duration/Target Date for Goal Attainment 07/08/25   PT Goals Bed Mobility;Transfers;Gait;Stairs   PT: Bed Mobility Independent;Supine to/from sit   PT: Transfers Modified independent;Sit to/from stand;Assistive device;Within precautions   PT: Gait Modified independent;Rolling walker;150 feet;Within precautions   PT: Stairs Supervision/stand-by assist;2 stairs;Rail on both sides;Within precautions   PT Discharge Planning   PT Plan no additional IP PT needs   PT Discharge Recommendation (DC Rec) home with assist;home with outpatient physical therapy   PT Rationale for DC Rec Patient from home alone, family able to support patient post operatively, good DME set up and acquired 2WW today. Following today's  functional mobility training, education and functional performance patient is able to complete mobility necessary to manage the home environment with assistance available at home. Anticipate the patient will do well with discharge home with progression to outpatient physical therapy in order to progress towards desired level of function in accordance with the surgeon's protocol. No skilled IP OT needs identified and no additional IP PT needs.   PT Brief overview of current status IND bed mobility, MOD IND sit to/from stand from EOB, recliner, toilet and wheelchair with 2WW. Ambulated 160' with 2WW MOD IND. SBA 12 stairs with step to pattern and single hand rail. immobilizer IND with daughter. HEP well tolerated, weak quad strength and 0-60* AROM   PT Total Distance Amb During Session (feet) 160   PT Equipment Needed at Discharge walker, rolling   Physical Therapy Time and Intention   Total Session Time (sum of timed and untimed services) 10     [facility]                                                                                   OUTPATIENT PHYSICAL THERAPY    PLAN OF TREATMENT FOR OUTPATIENT REHABILITATION   Patient's Last Name, First Name, M.I.  Joyce Beck YOB: 1970   Provider's Name   [facility]   Medical Record No.  [id number]     Onset Date: 07/08/25 Start of Care Date: 07/08/25     Medical Diagnosis:  s/p TKA               PT Diagnosis:  impaired mobility, muscle weakness, joint stiffness, decreased activity tolerance Certification Dates:  From: 07/08/25  To: 07/08/25       See note for plan of treatment, functional goals, and certification details.    I CERTIFY THE NEED FOR THESE SERVICES FURNISHED UNDER        THIS PLAN OF TREATMENT AND WHILE UNDER MY CARE (Physician co-signature of this document indicates review and certification of the therapy plan).              Thank you for your referral.  Natacha Whitley, PT,  DPT, ATC, ODALYS    [facility]  O: [phone]  E: [email or web address]

## 2025-07-08 NOTE — ANESTHESIA PROCEDURE NOTES
"Intrathecal Procedure Note    Pre-Procedure   Staff -        CRNA: Gabriel Lynch APRN CRNA       Performed By: CRNA       Location: OR       Pre-Anesthestic Checklist: patient identified, IV checked, risks and benefits discussed, informed consent, monitors and equipment checked and pre-op evaluation  Timeout:       Correct Patient: Yes        Correct Procedure: Yes        Correct Site: Yes        Correct Position: Yes   Procedure Documentation  Procedure: intrathecal         Diagnosis: Osteoarthritis right knee       Patient Position: sitting       Patient Prep/Sterile Barriers: sterile gloves, mask, patient draped       Skin prep: Betadine       Insertion Site: L3-4. (midline approach).       Needle Gauge: 25.        Needle Length (Inches): 3.5        Spinal Needle Type: Pencan       Introducer used       Introducer: 20 G       # of attempts: 1 and  # of redirects:  0    Assessment/Narrative         Paresthesias: No.       CSF fluid: clear.       Opening pressure was cmH2O while  Sitting.       Comments:  Very easy spinal placement without noted incident      FOR [facility] ([facility] ONLY:   Pain Team Contact information: please page the Pain Team Via [employer]. Search \"Pain\". During daytime hours, please page the attending first. At night please page the resident first.      "

## 2025-07-11 PROBLEM — R26.89 IMPAIRED GAIT AND MOBILITY: Status: ACTIVE | Noted: 2025-07-11

## 2025-07-11 PROBLEM — Z96.651 STATUS POST TOTAL RIGHT KNEE REPLACEMENT: Status: ACTIVE | Noted: 2025-07-11

## 2025-07-14 ENCOUNTER — OFFICE VISIT (OUTPATIENT)
Dept: FAMILY MEDICINE | Facility: CLINIC | Age: 55
End: 2025-07-14
Payer: COMMERCIAL

## 2025-07-14 VITALS
DIASTOLIC BLOOD PRESSURE: 77 MMHG | WEIGHT: 200.4 LBS | HEART RATE: 71 BPM | SYSTOLIC BLOOD PRESSURE: 110 MMHG | RESPIRATION RATE: 10 BRPM | TEMPERATURE: 97.4 F | OXYGEN SATURATION: 98 % | BODY MASS INDEX: 35.51 KG/M2 | HEIGHT: 63 IN

## 2025-07-14 DIAGNOSIS — R21 RASH: Primary | ICD-10-CM

## 2025-07-14 PROCEDURE — 3074F SYST BP LT 130 MM HG: CPT

## 2025-07-14 PROCEDURE — 3078F DIAST BP <80 MM HG: CPT

## 2025-07-14 PROCEDURE — 99214 OFFICE O/P EST MOD 30 MIN: CPT | Mod: 24

## 2025-07-14 PROCEDURE — 1125F AMNT PAIN NOTED PAIN PRSNT: CPT

## 2025-07-14 RX ORDER — TRIAMCINOLONE ACETONIDE 1 MG/G
CREAM TOPICAL 2 TIMES DAILY
Qty: 80 G | Refills: 0 | Status: SHIPPED | OUTPATIENT
Start: 2025-07-14 | End: 2025-07-28

## 2025-07-14 ASSESSMENT — PAIN SCALES - GENERAL: PAINLEVEL_OUTOF10: MILD PAIN (1)

## 2025-07-14 NOTE — PROGRESS NOTES
Assessment & Plan     Rash  - triamcinolone (KENALOG) 0.1 % external cream; Apply topically 2 times daily for 14 days.    Assessment & Plan  Assessment  Potential cellulitis is considered due to the presence of redness and warmth around the surgical site following the knee replacement on July 8, 2025. The concern for infection is heightened given the recent surgery, and there is a low threshold for initiating antibiotics to prevent possible complications.    Plan  Triamcinolone cream is prescribed for the rash on the right upper arm, to be applied twice daily for up to 14 days. The rash should be kept open to air, avoiding any covering, to allow it to dry naturally. Gentle, unscented soap and water should be used for showering to prevent further irritation. Monitoring for signs of infection such as fever or chills is advised, with a low threshold for initiating antibiotic therapy if these symptoms develop. A follow-up appointment with Doctor Arriola is scheduled for July 23, 2025, to reassess the condition and determine further management.          Garima Cook is a 55 year old, presenting for the following health issues:  Rash      7/14/2025     1:00 PM   Additional Questions   Roomed by Jolene   Accompanied by daughter inga and grandson kristyn         7/14/2025     1:00 PM   Patient Reported Additional Medications   Patient reports taking the following new medications none     Rash  Associated symptoms include a rash.   History of Present Illness       Reason for visit:  Rash on upperarm and inner right arm where. Blood pressure cuff was during surgery  Symptoms include:  Rash /heat  Symptom intensity:  Moderate  Symptom progression:  Staying the same  Had these symptoms before:  No  What makes it worse:  No  What makes it better:  ?   She is taking medications regularly.      History of Present Illness-  Joyce Bcek, a 55-year-old female, reported a rash around her right upper arm, which she believes is  "related to the use of a blood pressure cuff. She recently underwent right knee replacement surgery on July 8, 2025, and has been attending physical therapy sessions. During these sessions, she noticed the rash, which appeared three days after the surgery. The rash has not increased in size but has become more red over time. Joyce suspects that the rash might be due to the removal of the IO band used during surgery, which she describes as causing a free exfoliation effect.    She mentioned using hydroxyzine to help with potential itching, although she is uncertain if the rash itches due to the medication's effects. Joyce expressed concern about the possibility of cellulitis and questioned whether antibiotics might be necessary, especially given her recent surgery. She has a known allergy to penicillin, which was considered during her pre-op and post-op care.    Joyce reported experiencing hot flashes occasionally, though they have been infrequent recently. She also mentioned being sleep-deprived, which has affected her ability to recall certain details. She has not experienced any fever or chills.                  Objective    /77 (BP Location: Left arm, Patient Position: Sitting, Cuff Size: Adult Large)   Pulse 71   Temp 97.4  F (36.3  C) (Temporal)   Resp 10   Ht 1.605 m (5' 3.19\")   Wt 90.9 kg (200 lb 6.4 oz)   SpO2 98%   BMI 35.29 kg/m    Body mass index is 35.29 kg/m .  Physical Exam     Right upper arm and behind right knee                Admission on 07/08/2025, Discharged on 07/08/2025   Component Date Value Ref Range Status    GLUCOSE BY METER POCT 07/08/2025 85  70 - 99 mg/dL Final     No results found for any visits on 07/14/25.  XR Knee Port Right 1/2 Views  Result Date: 7/8/2025  EXAM: XR KNEE PORT RIGHT 1/2 VIEWS LOCATION: [facility] DATE: 7/8/2025 INDICATION: Post Op Total Knee COMPARISON: 4/9/2025     IMPRESSION: Right knee arthroplasty without patellar " resurfacing. Negative for postoperative purposes.         Signed Electronically by: Marva Suazo PA-C

## 2025-07-14 NOTE — PATIENT INSTRUCTIONS
Based on our discussion, I have outlined the following instructions for you:      - Apply the Triamcinolone cream to the rash on your right upper arm two times a day. Do this for up to 14 days.  - Let the rash be open to the air and do not cover it. This will help it dry naturally.  - Use gentle, unscented soap and water when you shower to avoid irritating the rash.  - Keep an eye out for signs of infection like fever or chills. If you notice these symptoms, it might be necessary to start treatment for an infection.  - You have a follow-up appointment with Doctor Arriola on July 23, 2025, to check how the rash is doing and decide what to do next.    Thank you again for your visit, and we look forward to supporting you in your journey to better health.      Triamcinalone cream twice a day over the right upper arm    Follow up with Dr. Montoya

## 2025-07-14 NOTE — Clinical Note
Right knee procedure on 7/8 with you, presents with redness around the back of the right leg and around right upper arm, what are your recommendations? When do you want her to remove the bandage over the right knee?

## 2025-07-15 ENCOUNTER — TELEPHONE (OUTPATIENT)
Dept: FAMILY MEDICINE | Facility: CLINIC | Age: 55
End: 2025-07-15
Payer: COMMERCIAL

## 2025-07-15 NOTE — TELEPHONE ENCOUNTER
General Call      Reason for Call:  pt req. Call back for an update on visit with Marva polanco in regards to getting a second opinion from surgeon about area behind the knee.     What are your questions or concerns:  NA    Date of last appointment with provider: NA    Could we send this information to you in [place] or would you prefer to receive a phone call?:   Patient would like to be contacted via [place]

## 2025-07-16 ENCOUNTER — THERAPY VISIT (OUTPATIENT)
Dept: PHYSICAL THERAPY | Facility: CLINIC | Age: 55
End: 2025-07-16
Attending: NURSE PRACTITIONER
Payer: COMMERCIAL

## 2025-07-16 DIAGNOSIS — Z96.651 STATUS POST TOTAL RIGHT KNEE REPLACEMENT: ICD-10-CM

## 2025-07-16 DIAGNOSIS — R26.89 IMPAIRED GAIT AND MOBILITY: ICD-10-CM

## 2025-07-16 DIAGNOSIS — M17.11 PRIMARY OSTEOARTHRITIS OF RIGHT KNEE: Primary | ICD-10-CM

## 2025-07-16 PROCEDURE — 97110 THERAPEUTIC EXERCISES: CPT | Mod: GP | Performed by: PHYSICAL THERAPIST

## 2025-07-16 PROCEDURE — 97530 THERAPEUTIC ACTIVITIES: CPT | Mod: GP | Performed by: PHYSICAL THERAPIST

## 2025-07-16 NOTE — PROGRESS NOTES
Orthopedic Clinic Post-Operative Note    CHIEF COMPLAINT:   Chief Complaint   Patient presents with    Surgical Followup     S/P total knee arthroplasty, right       HISTORY OF PRESENT ILLNESS  Today's visit:  Presents with daughter. Reports doing well overall. Pain slowly improving .stopped the oxycodone. Reports tolerable. Attending therapy and going well. Feels making improvements to her motion.  No incision concerns. Does note she added some dermabond to the superior pole of the incision after removing the aquacell and some of the glue came off. No bleeding with this.     Was seen for a rash to her right upper arm and posterior knee postop. No rash around her incision. Was given a steroid cream and reports much improved. No new issues.       July 8, 2025: Right total knee replacement      April 9, 2025 office visit:  Many year history of slowly progressive right greater than left knee pain.  Pain is mostly medial, but will be global at times. No radiating. Constant. Can be severe at times. Was recently seen by Dr. Can and underwent a ultrasound-guided right knee intra-articular corticosteroid injection on April 1, 2025.  She is also had a history of bilateral Synvisc 1 injections January 5, 2024. Has also used multiple types of bracing, rest, activity modification, weight loss and voltaren. Despite this the knees continue to get worse. 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 Also endorses the knees giving out, has had previous falls, and locking at times. She notes the pain is causing fatigue. Reports has very minimal ability to walk distances due to the pain.   No previous surgeries to the knees.     Patient's past medical, surgical, social and family histories reviewed.     Past Medical History:   Diagnosis Date    Unspecified hypothyroidism        Past Surgical History:   Procedure Laterality Date    ARTHROPLASTY, KNEE, ROBOT ASSISTED, USING  ARIANA Right 7/8/2025    Procedure: Right ariana assisted total knee replacement;  Surgeon: Moises Montoya DO;  Location: [facility] OR    COLONOSCOPY N/A 1/24/2025    Procedure: Colonoscopy with biopsy;  Surgeon: Randall Adorno MD;  Location:  GI    [facility] LIGATE FALLOPIAN TUBE         Medications:  Current Outpatient Medications   Medication Sig Dispense Refill    acetaminophen (TYLENOL) 325 MG tablet Take 3 tablets (975 mg) by mouth every 8 hours as needed for other (mild pain). 70 tablet 1    aspirin (ASA) 325 MG EC tablet Take 1 tablet (325 mg) by mouth 2 times daily. 84 tablet 0    hydrOXYzine HCl (ATARAX) 25 MG tablet Take 1 tablet (25 mg) by mouth every 6 hours as needed for itching or anxiety (with pain, moderate pain). 30 tablet 0    levothyroxine (SYNTHROID/LEVOTHROID) 88 MCG tablet Take 1 tablet (88 mcg) by mouth daily. 90 tablet 3    senna-docusate (SENOKOT-S/PERICOLACE) 8.6-50 MG tablet Take 1-2 tablets by mouth 2 times daily. Take while on oral narcotics to prevent or treat constipation. 30 tablet 0    tiZANidine (ZANAFLEX) 4 MG tablet Take 1 tablet (4 mg) by mouth 3 times daily as needed for muscle spasms. 45 tablet 1    triamcinolone (KENALOG) 0.1 % external cream Apply topically 2 times daily for 14 days. 80 g 0    oxyCODONE (ROXICODONE) 5 MG tablet Take 1-2 tablets (5-10 mg) by mouth every 4 hours as needed for moderate to severe pain. (Patient not taking: Reported on 7/23/2025) 30 tablet 0     No current facility-administered medications for this visit.       Allergies   Allergen Reactions    Penicillins Other (See Comments) and Swelling     Seizure        Social History     Occupational History    Not on file   Tobacco Use    Smoking status: Never     Passive exposure: Never    Smokeless tobacco: Never   Vaping Use    Vaping status: Never Used   Substance and Sexual Activity    Alcohol use: No    Drug use: No    Sexual activity: Yes     Birth control/protection: Surgical       Family History  "  Problem Relation Age of Onset    Diabetes Paternal Grandfather     Hypertension Paternal Grandfather     Diabetes Brother     Diabetes Sister     Heart Disease Maternal Grandfather         MI     Cancer No family hx of     Cerebrovascular Disease No family hx of        REVIEW OF SYSTEMS  General: negative for, night sweats, dizziness, fatigue  Resp: No shortness of breath and no cough  CV: negative for chest pain, syncope or near-syncope  GI: negative for nausea, vomiting and diarrhea  : negative for dysuria and hematuria  Musculoskeletal: as above  Neurologic: negative for syncope   Hematologic: negative for bleeding disorder    Physical Exam:  Vitals: Ht 1.605 m (5' 3.19\")   Wt 91 kg (200 lb 9.6 oz)   BMI 35.32 kg/m    BMI= Body mass index is 35.32 kg/m .  Constitutional: healthy, alert and no acute distress   Psychiatric: mentation appears normal and affect normal/bright  NEURO: no focal deficits  SKIN: both incisions are healing well, well approximated skin edges, without signs of infection including no erythema, incision breakdown or purlent drainage  JOINT/EXTREMITIES: right knee: expected postop swelling to the knee. Minimum to no swelling distal of the knee. AROM 15-90. PROM 3-95.  Able to straight leg raise. No instability. Patella tracks midline. Calf soft non-tender. No focal areas of warmth, tenderness, erythema. Distal neurovascular grossly intact.   Faint residual rash to the right upper arm in the approximate shape of the blood pressure cuff.  Very faint bruising/residual rash to the posterior knee. Not raised. No hives. Non-tender.   GAIT: not tested     Diagnostic Modalities:  right knee X-ray: The prosthesis has acceptable alignment. No fractures or dislocations. Prosthesis is well seated with no evidence of loosening press-fit Roderick knee patella not resurfaced  Independent visualization of the images was performed.      Impression:   Chief Complaint   Patient presents with    " Surgical Followup     S/P total knee arthroplasty, right       Plan:   Activity: continue therapy. Continue to progress as tolerated.   Staples/Sutures out: Not applicable. Monocryl and dermal glue closure. Do not soak or submerge. Ok to leave open to the air.   Pain controlled: Yes. Continue to use: muscle relaxer, tylenol, icing elevation.  She did note when elevating she has been placing a pillow or 2 behind the knee for comfort. Demonstrated and explained proper pillow placement, discussed importance of the extension with elevating and rest.    Immobilzation: No  Also discussed elevation. Icing,   Continue taking ASA for DVT prevention for 4 weeks        Return to clinic 4, week(s), or sooner as needed for changes.    Re-x-ray on return: No    Dr. Montoya was present in the office.  He personally discussed the care plan and reviewed all appropriate imaging.    PERRY Diaz, CNP  Orthopedic Surgery

## 2025-07-16 NOTE — TELEPHONE ENCOUNTER
Patient notified of provider's recommendation. Patient in agreement. With plan.    Izabella Ritter, RN on 7/16/2025 at 1:01 PM                Marva Suazo PA-C to [facility] (Selected Message)  KW      7/16/25 12:37 PM  I messaged the surgeon and have not heart back yet. I got a second opinion from another provider who recommends applying the Rx cream (triamcinolone) to the back of the knee and follow up with her PCP.      Marva Suazo PA-C

## 2025-07-17 ENCOUNTER — TELEPHONE (OUTPATIENT)
Dept: ORTHOPEDICS | Facility: CLINIC | Age: 55
End: 2025-07-17
Payer: COMMERCIAL

## 2025-07-17 NOTE — TELEPHONE ENCOUNTER
Called and followed up with patient.   Had recently seen pcp for a rash to the upper arm. Was given a kenalog cream and reports the rash is much better. The area behind the knee is described more as bruising. No raised rash but is improving.  Overall the pain to the knee is improving. Off the oxycodone.  Attending therapy. Removed the aquacell dressing and covering with gauze. No incision concerns.        Will plan to see at previously scheduled post-op visit.  Contact the clinic or get seen for any new issues.      PERRY Diaz, CNP  Orthopedic Surgery

## 2025-07-18 ASSESSMENT — KOOS JR
TWISING OR PIVOTING ON KNEE: MODERATE
RISING FROM SITTING: MILD
BENDING TO THE FLOOR TO PICK UP OBJECT: MILD
HOW SEVERE IS YOUR KNEE STIFFNESS AFTER FIRST WAKING IN MORNING: SEVERE
KOOS JR SCORING: 54.84
GOING UP OR DOWN STAIRS: MODERATE
STANDING UPRIGHT: MODERATE
STRAIGHTENING KNEE FULLY: MODERATE

## 2025-07-23 ENCOUNTER — ANCILLARY PROCEDURE (OUTPATIENT)
Dept: GENERAL RADIOLOGY | Facility: CLINIC | Age: 55
End: 2025-07-23
Attending: NURSE PRACTITIONER
Payer: COMMERCIAL

## 2025-07-23 ENCOUNTER — OFFICE VISIT (OUTPATIENT)
Dept: ORTHOPEDICS | Facility: CLINIC | Age: 55
End: 2025-07-23
Payer: COMMERCIAL

## 2025-07-23 ENCOUNTER — TELEPHONE (OUTPATIENT)
Dept: FAMILY MEDICINE | Facility: CLINIC | Age: 55
End: 2025-07-23

## 2025-07-23 ENCOUNTER — THERAPY VISIT (OUTPATIENT)
Dept: PHYSICAL THERAPY | Facility: CLINIC | Age: 55
End: 2025-07-23
Attending: NURSE PRACTITIONER
Payer: COMMERCIAL

## 2025-07-23 VITALS — WEIGHT: 200.6 LBS | BODY MASS INDEX: 35.54 KG/M2 | HEIGHT: 63 IN

## 2025-07-23 DIAGNOSIS — Z96.651 STATUS POST TOTAL RIGHT KNEE REPLACEMENT: ICD-10-CM

## 2025-07-23 DIAGNOSIS — Z96.651 S/P TOTAL KNEE ARTHROPLASTY, RIGHT: Primary | ICD-10-CM

## 2025-07-23 DIAGNOSIS — Z96.651 S/P TOTAL KNEE ARTHROPLASTY, RIGHT: ICD-10-CM

## 2025-07-23 DIAGNOSIS — R26.89 IMPAIRED GAIT AND MOBILITY: ICD-10-CM

## 2025-07-23 DIAGNOSIS — M17.11 PRIMARY OSTEOARTHRITIS OF RIGHT KNEE: Primary | ICD-10-CM

## 2025-07-23 PROCEDURE — 73562 X-RAY EXAM OF KNEE 3: CPT | Mod: TC | Performed by: STUDENT IN AN ORGANIZED HEALTH CARE EDUCATION/TRAINING PROGRAM

## 2025-07-23 PROCEDURE — 97116 GAIT TRAINING THERAPY: CPT | Mod: GP | Performed by: PHYSICAL THERAPIST

## 2025-07-23 PROCEDURE — 97110 THERAPEUTIC EXERCISES: CPT | Mod: GP | Performed by: PHYSICAL THERAPIST

## 2025-07-23 PROCEDURE — 99024 POSTOP FOLLOW-UP VISIT: CPT | Performed by: ORTHOPAEDIC SURGERY

## 2025-07-23 NOTE — LETTER
7/23/2025      Joyce Beck  [id number] [address] [facility] [address]      Dear Colleague,    Thank you for referring your patient, Joyce Beck, to the [facility]. Please see a copy of my visit note below.    Orthopedic Clinic Post-Operative Note    CHIEF COMPLAINT:   Chief Complaint   Patient presents with     Surgical Followup     S/P total knee arthroplasty, right       HISTORY OF PRESENT ILLNESS  Today's visit:  Presents with daughter. Reports doing well overall. Pain slowly improving .stopped the oxycodone. Reports tolerable. Attending therapy and going well. Feels making improvements to her motion.  No incision concerns. Does note she added some dermabond to the superior pole of the incision after removing the aquacell and some of the glue came off. No bleeding with this.     Was seen for a rash to her right upper arm and posterior knee postop. No rash around her incision. Was given a steroid cream and reports much improved. No new issues.       July 8, 2025: Right total knee replacement      April 9, 2025 office visit:  Many year history of slowly progressive right greater than left knee pain.  Pain is mostly medial, but will be global at times. No radiating. Constant. Can be severe at times. Was recently seen by Dr. Can and underwent a ultrasound-guided right knee intra-articular corticosteroid injection on April 1, 2025.  She is also had a history of bilateral Synvisc 1 injections January 5, 2024. Has also used multiple types of bracing, rest, activity modification, weight loss and voltaren. Despite this the knees continue to get worse. 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 Also endorses the knees giving out, has had previous falls, and locking at times. She notes the pain is causing fatigue. Reports has very minimal ability to walk distances due to the pain.   No previous surgeries to the knees.     Patient's  past medical, surgical, social and family histories reviewed.     Past Medical History:   Diagnosis Date     Unspecified hypothyroidism        Past Surgical History:   Procedure Laterality Date     ARTHROPLASTY, KNEE, ROBOT ASSISTED, USING ARIANA Right 7/8/2025    Procedure: Right ariana assisted total knee replacement;  Surgeon: Moises Montoya DO;  Location: [facility] OR     COLONOSCOPY N/A 1/24/2025    Procedure: Colonoscopy with biopsy;  Surgeon: Randall Adorno MD;  Location:  GI     [facility] LIGATE FALLOPIAN TUBE         Medications:  Current Outpatient Medications   Medication Sig Dispense Refill     acetaminophen (TYLENOL) 325 MG tablet Take 3 tablets (975 mg) by mouth every 8 hours as needed for other (mild pain). 70 tablet 1     aspirin (ASA) 325 MG EC tablet Take 1 tablet (325 mg) by mouth 2 times daily. 84 tablet 0     hydrOXYzine HCl (ATARAX) 25 MG tablet Take 1 tablet (25 mg) by mouth every 6 hours as needed for itching or anxiety (with pain, moderate pain). 30 tablet 0     levothyroxine (SYNTHROID/LEVOTHROID) 88 MCG tablet Take 1 tablet (88 mcg) by mouth daily. 90 tablet 3     senna-docusate (SENOKOT-S/PERICOLACE) 8.6-50 MG tablet Take 1-2 tablets by mouth 2 times daily. Take while on oral narcotics to prevent or treat constipation. 30 tablet 0     tiZANidine (ZANAFLEX) 4 MG tablet Take 1 tablet (4 mg) by mouth 3 times daily as needed for muscle spasms. 45 tablet 1     triamcinolone (KENALOG) 0.1 % external cream Apply topically 2 times daily for 14 days. 80 g 0     oxyCODONE (ROXICODONE) 5 MG tablet Take 1-2 tablets (5-10 mg) by mouth every 4 hours as needed for moderate to severe pain. (Patient not taking: Reported on 7/23/2025) 30 tablet 0     No current facility-administered medications for this visit.       Allergies   Allergen Reactions     Penicillins Other (See Comments) and Swelling     Seizure        Social History     Occupational History     Not on file   Tobacco Use     Smoking status:  "Never     Passive exposure: Never     Smokeless tobacco: Never   Vaping Use     Vaping status: Never Used   Substance and Sexual Activity     Alcohol use: No     Drug use: No     Sexual activity: Yes     Birth control/protection: Surgical       Family History   Problem Relation Age of Onset     Diabetes Paternal Grandfather      Hypertension Paternal Grandfather      Diabetes Brother      Diabetes Sister      Heart Disease Maternal Grandfather         MI      Cancer No family hx of      Cerebrovascular Disease No family hx of        REVIEW OF SYSTEMS  General: negative for, night sweats, dizziness, fatigue  Resp: No shortness of breath and no cough  CV: negative for chest pain, syncope or near-syncope  GI: negative for nausea, vomiting and diarrhea  : negative for dysuria and hematuria  Musculoskeletal: as above  Neurologic: negative for syncope   Hematologic: negative for bleeding disorder    Physical Exam:  Vitals: Ht 1.605 m (5' 3.19\")   Wt 91 kg (200 lb 9.6 oz)   BMI 35.32 kg/m    BMI= Body mass index is 35.32 kg/m .  Constitutional: healthy, alert and no acute distress   Psychiatric: mentation appears normal and affect normal/bright  NEURO: no focal deficits  SKIN: both incisions are healing well, well approximated skin edges, without signs of infection including no erythema, incision breakdown or purlent drainage  JOINT/EXTREMITIES: right knee: expected postop swelling to the knee. Minimum to no swelling distal of the knee. AROM 15-90. PROM 3-95.  Able to straight leg raise. No instability. Patella tracks midline. Calf soft non-tender. No focal areas of warmth, tenderness, erythema. Distal neurovascular grossly intact.   Faint residual rash to the right upper arm in the approximate shape of the blood pressure cuff.  Very faint bruising/residual rash to the posterior knee. Not raised. No hives. Non-tender.   GAIT: not tested     Diagnostic Modalities:  right knee X-ray: The prosthesis has acceptable " alignment. No fractures or dislocations. Prosthesis is well seated with no evidence of loosening press-fit Roderick knee patella not resurfaced  Independent visualization of the images was performed.      Impression:   Chief Complaint   Patient presents with     Surgical Followup     S/P total knee arthroplasty, right       Plan:   Activity: continue therapy. Continue to progress as tolerated.   Staples/Sutures out: Not applicable. Monocryl and dermal glue closure. Do not soak or submerge. Ok to leave open to the air.   Pain controlled: Yes. Continue to use: muscle relaxer, tylenol, icing elevation.  She did note when elevating she has been placing a pillow or 2 behind the knee for comfort. Demonstrated and explained proper pillow placement, discussed importance of the extension with elevating and rest.    Immobilzation: No  Also discussed elevation. Icing,   Continue taking ASA for DVT prevention for 4 weeks        Return to clinic 4, week(s), or sooner as needed for changes.    Re-x-ray on return: No    Dr. Montoya was present in the office.  He personally discussed the care plan and reviewed all appropriate imaging.    PERRY Diaz, CNP  Orthopedic Surgery      Again, thank you for allowing me to participate in the care of your patient.        Sincerely,        Moises Montoya, DO    Electronically signed

## 2025-07-27 ENCOUNTER — MYC REFILL (OUTPATIENT)
Dept: FAMILY MEDICINE | Facility: OTHER | Age: 55
End: 2025-07-27
Payer: COMMERCIAL

## 2025-07-27 DIAGNOSIS — Z96.651 S/P TOTAL KNEE REPLACEMENT NOT USING CEMENT, RIGHT: ICD-10-CM

## 2025-07-28 ENCOUNTER — THERAPY VISIT (OUTPATIENT)
Dept: PHYSICAL THERAPY | Facility: CLINIC | Age: 55
End: 2025-07-28
Attending: NURSE PRACTITIONER
Payer: COMMERCIAL

## 2025-07-28 DIAGNOSIS — R26.89 IMPAIRED GAIT AND MOBILITY: ICD-10-CM

## 2025-07-28 DIAGNOSIS — M17.11 PRIMARY OSTEOARTHRITIS OF RIGHT KNEE: Primary | ICD-10-CM

## 2025-07-28 DIAGNOSIS — Z96.651 STATUS POST TOTAL RIGHT KNEE REPLACEMENT: ICD-10-CM

## 2025-07-28 PROCEDURE — 97110 THERAPEUTIC EXERCISES: CPT | Mod: GP | Performed by: PHYSICAL THERAPIST

## 2025-07-28 PROCEDURE — 97140 MANUAL THERAPY 1/> REGIONS: CPT | Mod: GP | Performed by: PHYSICAL THERAPIST

## 2025-07-28 RX ORDER — ACETAMINOPHEN 325 MG/1
975 TABLET ORAL EVERY 8 HOURS PRN
Qty: 70 TABLET | Refills: 1 | OUTPATIENT
Start: 2025-07-28

## 2025-07-28 NOTE — TELEPHONE ENCOUNTER
Writer spoke with patient who was advised that she has refills available at her pharmacy. Patient verbalized understanding and will reach out to pharmacy.   Jo Pierre RN on 7/28/2025 at 9:44 AM

## 2025-08-04 ENCOUNTER — THERAPY VISIT (OUTPATIENT)
Dept: PHYSICAL THERAPY | Facility: CLINIC | Age: 55
End: 2025-08-04
Attending: NURSE PRACTITIONER
Payer: COMMERCIAL

## 2025-08-04 DIAGNOSIS — M17.11 PRIMARY OSTEOARTHRITIS OF RIGHT KNEE: Primary | ICD-10-CM

## 2025-08-04 DIAGNOSIS — R26.89 IMPAIRED GAIT AND MOBILITY: ICD-10-CM

## 2025-08-04 DIAGNOSIS — Z96.651 STATUS POST TOTAL RIGHT KNEE REPLACEMENT: ICD-10-CM

## 2025-08-04 PROCEDURE — 97110 THERAPEUTIC EXERCISES: CPT | Mod: GP | Performed by: PHYSICAL THERAPIST

## 2025-08-06 ENCOUNTER — THERAPY VISIT (OUTPATIENT)
Dept: PHYSICAL THERAPY | Facility: CLINIC | Age: 55
End: 2025-08-06
Attending: NURSE PRACTITIONER
Payer: COMMERCIAL

## 2025-08-06 DIAGNOSIS — Z96.651 STATUS POST TOTAL RIGHT KNEE REPLACEMENT: ICD-10-CM

## 2025-08-06 DIAGNOSIS — M17.11 PRIMARY OSTEOARTHRITIS OF RIGHT KNEE: Primary | ICD-10-CM

## 2025-08-06 DIAGNOSIS — R26.89 IMPAIRED GAIT AND MOBILITY: ICD-10-CM

## 2025-08-06 PROCEDURE — 97110 THERAPEUTIC EXERCISES: CPT | Mod: GP | Performed by: PHYSICAL THERAPIST

## 2025-08-11 ENCOUNTER — THERAPY VISIT (OUTPATIENT)
Dept: PHYSICAL THERAPY | Facility: CLINIC | Age: 55
End: 2025-08-11
Attending: NURSE PRACTITIONER
Payer: COMMERCIAL

## 2025-08-11 DIAGNOSIS — Z96.651 STATUS POST TOTAL RIGHT KNEE REPLACEMENT: ICD-10-CM

## 2025-08-11 DIAGNOSIS — M17.11 PRIMARY OSTEOARTHRITIS OF RIGHT KNEE: Primary | ICD-10-CM

## 2025-08-11 DIAGNOSIS — R26.89 IMPAIRED GAIT AND MOBILITY: ICD-10-CM

## 2025-08-11 PROCEDURE — 97110 THERAPEUTIC EXERCISES: CPT | Mod: GP | Performed by: PHYSICAL THERAPIST

## 2025-08-13 ENCOUNTER — THERAPY VISIT (OUTPATIENT)
Dept: PHYSICAL THERAPY | Facility: CLINIC | Age: 55
End: 2025-08-13
Attending: NURSE PRACTITIONER
Payer: COMMERCIAL

## 2025-08-13 DIAGNOSIS — R26.89 IMPAIRED GAIT AND MOBILITY: ICD-10-CM

## 2025-08-13 DIAGNOSIS — M17.11 PRIMARY OSTEOARTHRITIS OF RIGHT KNEE: Primary | ICD-10-CM

## 2025-08-13 DIAGNOSIS — Z96.651 STATUS POST TOTAL RIGHT KNEE REPLACEMENT: ICD-10-CM

## 2025-08-13 PROCEDURE — 97140 MANUAL THERAPY 1/> REGIONS: CPT | Mod: GP | Performed by: PHYSICAL THERAPIST

## 2025-08-13 PROCEDURE — 97110 THERAPEUTIC EXERCISES: CPT | Mod: GP | Performed by: PHYSICAL THERAPIST

## 2025-08-18 ENCOUNTER — THERAPY VISIT (OUTPATIENT)
Dept: PHYSICAL THERAPY | Facility: CLINIC | Age: 55
End: 2025-08-18
Attending: NURSE PRACTITIONER
Payer: COMMERCIAL

## 2025-08-18 DIAGNOSIS — Z96.651 STATUS POST TOTAL RIGHT KNEE REPLACEMENT: ICD-10-CM

## 2025-08-18 DIAGNOSIS — M17.11 PRIMARY OSTEOARTHRITIS OF RIGHT KNEE: Primary | ICD-10-CM

## 2025-08-18 DIAGNOSIS — R26.89 IMPAIRED GAIT AND MOBILITY: ICD-10-CM

## 2025-08-18 PROCEDURE — 97140 MANUAL THERAPY 1/> REGIONS: CPT | Mod: GP | Performed by: PHYSICAL THERAPIST

## 2025-08-18 PROCEDURE — 97110 THERAPEUTIC EXERCISES: CPT | Mod: GP | Performed by: PHYSICAL THERAPIST

## 2025-08-22 ASSESSMENT — KOOS JR
STRAIGHTENING KNEE FULLY: MODERATE
RISING FROM SITTING: MILD
HOW SEVERE IS YOUR KNEE STIFFNESS AFTER FIRST WAKING IN MORNING: MODERATE
GOING UP OR DOWN STAIRS: MILD
KOOS JR SCORING: 61.58
TWISING OR PIVOTING ON KNEE: MODERATE
BENDING TO THE FLOOR TO PICK UP OBJECT: MILD
STANDING UPRIGHT: MILD

## 2025-08-25 ENCOUNTER — THERAPY VISIT (OUTPATIENT)
Dept: PHYSICAL THERAPY | Facility: CLINIC | Age: 55
End: 2025-08-25
Attending: NURSE PRACTITIONER
Payer: COMMERCIAL

## 2025-08-25 ENCOUNTER — OFFICE VISIT (OUTPATIENT)
Dept: ORTHOPEDICS | Facility: CLINIC | Age: 55
End: 2025-08-25
Payer: COMMERCIAL

## 2025-08-25 DIAGNOSIS — R26.89 IMPAIRED GAIT AND MOBILITY: ICD-10-CM

## 2025-08-25 DIAGNOSIS — Z96.651 S/P TOTAL KNEE ARTHROPLASTY, RIGHT: Primary | ICD-10-CM

## 2025-08-25 DIAGNOSIS — Z96.651 STATUS POST TOTAL RIGHT KNEE REPLACEMENT: ICD-10-CM

## 2025-08-25 DIAGNOSIS — M17.11 PRIMARY OSTEOARTHRITIS OF RIGHT KNEE: Primary | ICD-10-CM

## 2025-08-25 PROCEDURE — 97116 GAIT TRAINING THERAPY: CPT | Mod: GP | Performed by: PHYSICAL THERAPIST

## 2025-08-25 PROCEDURE — 97112 NEUROMUSCULAR REEDUCATION: CPT | Mod: GP | Performed by: PHYSICAL THERAPIST

## 2025-08-25 PROCEDURE — 97110 THERAPEUTIC EXERCISES: CPT | Mod: GP | Performed by: PHYSICAL THERAPIST

## 2025-08-25 PROCEDURE — 99024 POSTOP FOLLOW-UP VISIT: CPT | Performed by: ORTHOPAEDIC SURGERY

## 2025-09-02 ENCOUNTER — THERAPY VISIT (OUTPATIENT)
Dept: PHYSICAL THERAPY | Facility: CLINIC | Age: 55
End: 2025-09-02
Attending: NURSE PRACTITIONER
Payer: COMMERCIAL

## 2025-09-02 DIAGNOSIS — Z96.651 STATUS POST TOTAL RIGHT KNEE REPLACEMENT: ICD-10-CM

## 2025-09-02 DIAGNOSIS — M17.11 PRIMARY OSTEOARTHRITIS OF RIGHT KNEE: Primary | ICD-10-CM

## 2025-09-02 DIAGNOSIS — R26.89 IMPAIRED GAIT AND MOBILITY: ICD-10-CM

## 2025-09-02 PROCEDURE — 97112 NEUROMUSCULAR REEDUCATION: CPT | Mod: GP | Performed by: PHYSICAL THERAPIST

## 2025-09-02 PROCEDURE — 97110 THERAPEUTIC EXERCISES: CPT | Mod: GP | Performed by: PHYSICAL THERAPIST

## 2025-09-02 PROCEDURE — 97140 MANUAL THERAPY 1/> REGIONS: CPT | Mod: GP | Performed by: PHYSICAL THERAPIST

## 2025-09-03 ENCOUNTER — MYC MEDICAL ADVICE (OUTPATIENT)
Dept: ORTHOPEDICS | Facility: CLINIC | Age: 55
End: 2025-09-03
Payer: COMMERCIAL

## 2025-09-03 DIAGNOSIS — M17.0 PRIMARY OSTEOARTHRITIS OF BOTH KNEES: ICD-10-CM

## 2025-09-03 RX ORDER — DICLOFENAC SODIUM 75 MG/1
75 TABLET, DELAYED RELEASE ORAL 2 TIMES DAILY PRN
Qty: 180 TABLET | Refills: 3 | Status: CANCELLED | OUTPATIENT
Start: 2025-09-03

## 2025-09-04 RX ORDER — DICLOFENAC SODIUM 75 MG/1
75 TABLET, DELAYED RELEASE ORAL 2 TIMES DAILY
Qty: 180 TABLET | Refills: 3 | Status: SHIPPED | OUTPATIENT
Start: 2025-09-04

## (undated) DEVICE — TOURNIQUET CUFF 34"

## (undated) DEVICE — GLOVE UNDER INDICATOR PI SZ 6 LF 41660

## (undated) DEVICE — KIT DRAPE RIO 1 PIECE POCKET RIO 111320

## (undated) DEVICE — SU MONOCRYL 4-0 PS-2 18" UND Y496G

## (undated) DEVICE — DRAPE CONVERTORS U-DRAPE 60X72" 8476

## (undated) DEVICE — GOWN XLG DISP 9545

## (undated) DEVICE — DRAPE POUCH INSTRUMENT 1018

## (undated) DEVICE — MARKER SURGICAL CHECKPOINT KIT STRL LF DISP 111645

## (undated) DEVICE — ENDO TRAP POLYP E-TRAP 00711099

## (undated) DEVICE — BNDG ESMARK 6" STERILE

## (undated) DEVICE — GLOVE BIOGEL INDICATOR 7.5 LF 41675

## (undated) DEVICE — DRSG AQUACEL AG 3.5X14"  422607

## (undated) DEVICE — GLOVE BIOGEL PI SZ 7.5 40875

## (undated) DEVICE — SOL WATER IRRIG 1000ML BOTTLE 07139-09

## (undated) DEVICE — SOL NACL 0.9% IRRIG 1000ML BOTTLE 07138-09

## (undated) DEVICE — PREP CHLORAPREP 26ML TINTED ORANGE  260815

## (undated) DEVICE — HOOD FLYTE 0408-800-000

## (undated) DEVICE — BNDG COBAN 6"X5YDS STERILE

## (undated) DEVICE — PEN MARKING SKIN

## (undated) DEVICE — GLOVE BIOGEL PI ULTRATOUCH G SZ 7.5 42175

## (undated) DEVICE — BLADE SAW OSCILLATING  99MMX2MM THK MAKO NAR STRL 116171

## (undated) DEVICE — DRAPE EXTREMITY W/ARMBOARD 29405

## (undated) DEVICE — SUCTION TIP YANKAUER ORTHO SUPER SUCKER EFS-111

## (undated) DEVICE — KIT ENDO TURNOVER/PROCEDURE CARRY-ON 101822

## (undated) DEVICE — TUBING SUCTION 6"X3/16" N56A

## (undated) DEVICE — SOL WATER IRRIG 1000ML BOTTLE 2F7114

## (undated) DEVICE — SYR EAR BULB 2OZ

## (undated) DEVICE — PACK TOTAL JOINT STD LATEX

## (undated) DEVICE — BONE CLEANING TIP INTERPULSE  0210-010-000

## (undated) DEVICE — ENDO SNARE EXACTO COLD 9MM LOOP 2.4MMX230CM 00711115

## (undated) DEVICE — SU VICRYL+ 0 OS-6 18IN UND VCP754T

## (undated) DEVICE — SU VICRYL+ 2-0 CP-2 18IN UND VCP762D

## (undated) DEVICE — SOL NACL 0.9% IRRIG 3000ML BAG 07972-08

## (undated) DEVICE — CORD RETRACTION SILICON 111619

## (undated) DEVICE — SUCTION IRR SYSTEM W/O TIP INTERPULSE HANDPIECE 0210-100-000

## (undated) DEVICE — ESU PENCIL SMOKE EVAC W/ROCKER SWITCH 0703-047-000

## (undated) DEVICE — SU DERMABOND ADVANCED .7ML DNX12

## (undated) DEVICE — MARKER SYS NAVIGATION VIZADISC KNEE TRACK KIT STRL LF 107120

## (undated) DEVICE — NDL COUNTER 40CT  31142311

## (undated) DEVICE — POSITIONER OR LEG KIT DISPOSABLE 111618

## (undated) DEVICE — GLOVE BIOGEL PI INDICATOR 8.0 LF 41680

## (undated) DEVICE — IMM KNEE 20" 79-80020

## (undated) RX ORDER — FENTANYL CITRATE 50 UG/ML
INJECTION, SOLUTION INTRAMUSCULAR; INTRAVENOUS
Status: DISPENSED
Start: 2025-07-08

## (undated) RX ORDER — LIDOCAINE HYDROCHLORIDE 10 MG/ML
INJECTION, SOLUTION EPIDURAL; INFILTRATION; INTRACAUDAL; PERINEURAL
Status: DISPENSED
Start: 2025-07-08

## (undated) RX ORDER — ONDANSETRON 2 MG/ML
INJECTION INTRAMUSCULAR; INTRAVENOUS
Status: DISPENSED
Start: 2025-07-08

## (undated) RX ORDER — KETOROLAC TROMETHAMINE 30 MG/ML
INJECTION, SOLUTION INTRAMUSCULAR; INTRAVENOUS
Status: DISPENSED
Start: 2025-07-08

## (undated) RX ORDER — FENTANYL CITRATE-0.9 % NACL/PF 10 MCG/ML
PLASTIC BAG, INJECTION (ML) INTRAVENOUS
Status: DISPENSED
Start: 2025-07-08

## (undated) RX ORDER — BUPIVACAINE HYDROCHLORIDE AND EPINEPHRINE 5; 5 MG/ML; UG/ML
INJECTION, SOLUTION EPIDURAL; INTRACAUDAL; PERINEURAL
Status: DISPENSED
Start: 2025-07-08

## (undated) RX ORDER — DEXAMETHASONE SODIUM PHOSPHATE 10 MG/ML
INJECTION, SOLUTION INTRAMUSCULAR; INTRAVENOUS
Status: DISPENSED
Start: 2025-07-08